# Patient Record
Sex: FEMALE | Race: BLACK OR AFRICAN AMERICAN | NOT HISPANIC OR LATINO | Employment: FULL TIME | URBAN - METROPOLITAN AREA
[De-identification: names, ages, dates, MRNs, and addresses within clinical notes are randomized per-mention and may not be internally consistent; named-entity substitution may affect disease eponyms.]

---

## 2018-07-06 ENCOUNTER — LAB REQUISITION (OUTPATIENT)
Dept: LAB | Facility: OTHER | Age: 23
End: 2018-07-06

## 2018-07-06 DIAGNOSIS — Z02.0 SCHOOL PHYSICAL EXAM: ICD-10-CM

## 2018-07-06 PROCEDURE — 86787 VARICELLA-ZOSTER ANTIBODY: CPT | Performed by: PREVENTIVE MEDICINE

## 2018-07-06 PROCEDURE — 86765 RUBEOLA ANTIBODY: CPT | Performed by: PREVENTIVE MEDICINE

## 2018-07-06 PROCEDURE — 86735 MUMPS ANTIBODY: CPT | Performed by: PREVENTIVE MEDICINE

## 2018-07-06 PROCEDURE — 86762 RUBELLA ANTIBODY: CPT | Performed by: PREVENTIVE MEDICINE

## 2018-07-10 LAB
MEV IGG SER IA-ACNC: POSITIVE
MUV IGG SER IA-ACNC: NEGATIVE
RUBV IGG SERPL IA-ACNC: POSITIVE
VZV IGG SER IA-ACNC: POSITIVE

## 2022-08-30 ENCOUNTER — OFFICE VISIT (OUTPATIENT)
Dept: INTERNAL MEDICINE | Facility: CLINIC | Age: 27
End: 2022-08-30

## 2022-08-30 VITALS
OXYGEN SATURATION: 98 % | DIASTOLIC BLOOD PRESSURE: 78 MMHG | TEMPERATURE: 96.9 F | BODY MASS INDEX: 39.27 KG/M2 | HEIGHT: 60 IN | HEART RATE: 69 BPM | WEIGHT: 200 LBS | SYSTOLIC BLOOD PRESSURE: 118 MMHG | RESPIRATION RATE: 16 BRPM

## 2022-08-30 DIAGNOSIS — D50.8 OTHER IRON DEFICIENCY ANEMIA: ICD-10-CM

## 2022-08-30 DIAGNOSIS — Z20.2 POSSIBLE EXPOSURE TO STD: ICD-10-CM

## 2022-08-30 DIAGNOSIS — Z00.00 ANNUAL PHYSICAL EXAM: Primary | ICD-10-CM

## 2022-08-30 DIAGNOSIS — E07.9 THYROID DISEASE: ICD-10-CM

## 2022-08-30 PROBLEM — D50.9 IDA (IRON DEFICIENCY ANEMIA): Status: ACTIVE | Noted: 2022-08-30

## 2022-08-30 PROCEDURE — 99395 PREV VISIT EST AGE 18-39: CPT | Performed by: NURSE PRACTITIONER

## 2022-08-30 PROCEDURE — 2014F MENTAL STATUS ASSESS: CPT | Performed by: NURSE PRACTITIONER

## 2022-08-30 PROCEDURE — 3008F BODY MASS INDEX DOCD: CPT | Performed by: NURSE PRACTITIONER

## 2022-08-30 NOTE — PROGRESS NOTES
"Chief Complaint  Establish Care    Subjective        Earl Maciel presents to [facility]  History of Present Illness  This is a 26 y/o female presenting to office for annual physical and establishment of care. Patient lives with partner and 2 children. Patient currently works as medical assistant.     Patient reports some exercise weekly. Patient reports that she is going to start following vegetarian diet.     Patient denies tobacco use. Patient reports occasional alcohol use.     Patient reports history of hypothyroidism before pregnancy however this self corrected post pregnancy.     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 Patient reports she never took the iron tablets due to constipation issues. Patient is concerned for possible anemia.     Patient reports needing gynecologist. Patient is due for pap smear. Patient is currently not on birth control.     Objective   Vital Signs:  /78 (BP Location: Right arm, Patient Position: Sitting, Cuff Size: Large Adult)   Pulse 69   Temp 96.9 °F (36.1 °C) (Temporal)   Resp 16   Ht 152.4 cm (60\")   Wt 90.7 kg (200 lb)   SpO2 98%   BMI 39.06 kg/m²   Estimated body mass index is 39.06 kg/m² as calculated from the following:    Height as of this encounter: 152.4 cm (60\").    Weight as of this encounter: 90.7 kg (200 lb).          Physical Exam  Constitutional:       General: She is awake.      Appearance: Normal appearance.   HENT:      Head: Normocephalic.      Right Ear: Hearing, tympanic membrane, ear canal and external ear normal.      Left Ear: Hearing, tympanic membrane, ear canal and external ear normal.      Nose: Nose normal.      Mouth/Throat:      Lips: Pink.      Mouth: Mucous membranes are moist.      Pharynx: Oropharynx is clear.   Eyes:      Extraocular Movements: Extraocular movements intact.      Conjunctiva/sclera: Conjunctivae normal.      Pupils: Pupils are equal, round, and reactive to light.   Cardiovascular:      Rate " and Rhythm: Normal rate and regular rhythm.      Pulses: Normal pulses.      Heart sounds: Normal heart sounds. No murmur heard.    No friction rub. No gallop.   Pulmonary:      Effort: Pulmonary effort is normal. No respiratory distress.      Breath sounds: Normal breath sounds. No wheezing or rales.   Abdominal:      General: Abdomen is flat. Bowel sounds are normal. There is no distension.      Palpations: Abdomen is soft.      Tenderness: There is no abdominal tenderness.   Musculoskeletal:         General: No swelling or deformity. Normal range of motion.      Cervical back: Normal range of motion and neck supple.   Skin:     General: Skin is warm and dry.      Capillary Refill: Capillary refill takes less than 2 seconds.      Coloration: Skin is not jaundiced.      Findings: No bruising.   Neurological:      General: No focal deficit present.      Mental Status: She is alert and oriented to person, place, and time. Mental status is at baseline.      Motor: Motor function is intact. No weakness.      Coordination: Coordination is intact.      Gait: Gait is intact.      Deep Tendon Reflexes: Reflexes are normal and symmetric.   Psychiatric:         Attention and Perception: Attention normal.         Mood and Affect: Mood normal.         Speech: Speech normal.         Behavior: Behavior normal. Behavior is cooperative.         Thought Content: Thought content normal.         Cognition and Memory: Cognition normal.         Judgment: Judgment normal.        Result Review :                Assessment and Plan   Diagnoses and all orders for this visit:    1. Annual physical exam (Primary)  Assessment & Plan:  Recommended 150-300 minutes weekly exercise.   Continue with healthy diet choices.   Labs ordered.   Pap smear due-- referral placed for gynecology.   Continue with monthly self breast examinations.   Anticipatory guidance given regarding health prevention/wellness, diet/exercise, tobacco/alcohol/drug education,  exercise and wellbeing, covid 19 guidance, and sexual health/STD education.       Orders:  -     CBC & Differential; Future  -     Comprehensive metabolic panel; Future  -     Lipid panel; Future  -     Hemoglobin A1c; Future  -     Ambulatory Referral to Gynecology    2. Thyroid disease  -     TSH Rfx On Abnormal To Free T4; Future    3. Other iron deficiency anemia  -     Comprehensive metabolic panel; Future  -     Iron; Future  -     Vitamin B12 and Folate; Future  -     Ferritin; Future    4. Possible exposure to STD  -     Chlamydia trachomatis, Neisseria gonorrhoeae, Trichomonas vaginalis, PCR - Urine, Urine, Clean Catch  -     HIV-1 / O / 2 Ag / Antibody 4th Generation; Future  -     RPR, Rfx Qn RPR / Confirm TP; Future           Follow Up   Return in about 1 year (around 8/30/2023) for Annual physical.  Patient was given instructions and counseling regarding her condition or for health maintenance advice. Please see specific information pulled into the AVS if appropriate.

## 2022-08-30 NOTE — ASSESSMENT & PLAN NOTE
Recommended 150-300 minutes weekly exercise.   Continue with healthy diet choices.   Labs ordered.   Pap smear due-- referral placed for gynecology.   Continue with monthly self breast examinations.   Anticipatory guidance given regarding health prevention/wellness, diet/exercise, tobacco/alcohol/drug education, exercise and wellbeing, covid 19 guidance, and sexual health/STD education.

## 2022-08-31 ENCOUNTER — LAB (OUTPATIENT)
Dept: INTERNAL MEDICINE | Facility: CLINIC | Age: 27
End: 2022-08-31

## 2022-08-31 DIAGNOSIS — D50.8 OTHER IRON DEFICIENCY ANEMIA: ICD-10-CM

## 2022-08-31 DIAGNOSIS — Z20.2 POSSIBLE EXPOSURE TO STD: ICD-10-CM

## 2022-08-31 DIAGNOSIS — E07.9 THYROID DISEASE: ICD-10-CM

## 2022-08-31 DIAGNOSIS — Z00.00 ANNUAL PHYSICAL EXAM: ICD-10-CM

## 2022-08-31 LAB
C TRACH RRNA SPEC QL NAA+PROBE: NEGATIVE
N GONORRHOEA RRNA SPEC QL NAA+PROBE: NEGATIVE
T VAGINALIS RRNA SPEC QL NAA+PROBE: NEGATIVE

## 2022-09-01 DIAGNOSIS — Z20.2 POSSIBLE EXPOSURE TO STD: Primary | ICD-10-CM

## 2022-09-01 DIAGNOSIS — D50.8 OTHER IRON DEFICIENCY ANEMIA: ICD-10-CM

## 2022-09-01 LAB
ALBUMIN SERPL-MCNC: 4 G/DL (ref 3.9–5)
ALBUMIN/GLOB SERPL: 1.5 {RATIO} (ref 1.2–2.2)
ALP SERPL-CCNC: 71 IU/L (ref 44–121)
ALT SERPL-CCNC: 12 IU/L (ref 0–32)
AST SERPL-CCNC: 14 IU/L (ref 0–40)
BASOPHILS # BLD AUTO: 0.1 X10E3/UL (ref 0–0.2)
BASOPHILS NFR BLD AUTO: 2 %
BILIRUB SERPL-MCNC: 0.2 MG/DL (ref 0–1.2)
BUN SERPL-MCNC: 8 MG/DL (ref 6–20)
BUN/CREAT SERPL: 9 (ref 9–23)
CALCIUM SERPL-MCNC: 8.9 MG/DL (ref 8.7–10.2)
CHLORIDE SERPL-SCNC: 103 MMOL/L (ref 96–106)
CHOLEST SERPL-MCNC: 163 MG/DL (ref 100–199)
CO2 SERPL-SCNC: 21 MMOL/L (ref 20–29)
CREAT SERPL-MCNC: 0.91 MG/DL (ref 0.57–1)
EGFRCR-CYS SERPLBLD CKD-EPI 2021: 90 ML/MIN/1.73
EOSINOPHIL # BLD AUTO: 0.1 X10E3/UL (ref 0–0.4)
EOSINOPHIL NFR BLD AUTO: 5 %
ERYTHROCYTE [DISTWIDTH] IN BLOOD BY AUTOMATED COUNT: 18.4 % (ref 11.7–15.4)
FERRITIN SERPL-MCNC: 7 NG/ML (ref 15–150)
FOLATE SERPL-MCNC: 6.4 NG/ML
GLOBULIN SER CALC-MCNC: 2.7 G/DL (ref 1.5–4.5)
GLUCOSE SERPL-MCNC: 95 MG/DL (ref 65–99)
HBA1C MFR BLD: 5.8 % (ref 4.8–5.6)
HCT VFR BLD AUTO: 27.3 % (ref 34–46.6)
HDLC SERPL-MCNC: 39 MG/DL
HGB BLD-MCNC: 7.9 G/DL (ref 11.1–15.9)
HIV 1+2 AB+HIV1 P24 AG SERPL QL IA: NON REACTIVE
IMM GRANULOCYTES # BLD AUTO: 0 X10E3/UL (ref 0–0.1)
IMM GRANULOCYTES NFR BLD AUTO: 0 %
IRON SERPL-MCNC: 17 UG/DL (ref 27–159)
LDLC SERPL CALC-MCNC: 110 MG/DL (ref 0–99)
LYMPHOCYTES # BLD AUTO: 1.3 X10E3/UL (ref 0.7–3.1)
LYMPHOCYTES NFR BLD AUTO: 41 %
MCH RBC QN AUTO: 19.8 PG (ref 26.6–33)
MCHC RBC AUTO-ENTMCNC: 28.9 G/DL (ref 31.5–35.7)
MCV RBC AUTO: 68 FL (ref 79–97)
MONOCYTES # BLD AUTO: 0.5 X10E3/UL (ref 0.1–0.9)
MONOCYTES NFR BLD AUTO: 15 %
NEUTROPHILS # BLD AUTO: 1.2 X10E3/UL (ref 1.4–7)
NEUTROPHILS NFR BLD AUTO: 37 %
PLATELET # BLD AUTO: 455 X10E3/UL (ref 150–450)
POTASSIUM SERPL-SCNC: 4.8 MMOL/L (ref 3.5–5.2)
PROT SERPL-MCNC: 6.7 G/DL (ref 6–8.5)
RBC # BLD AUTO: 3.99 X10E6/UL (ref 3.77–5.28)
RPR SER QL: REACTIVE
RPR SER-TITR: ABNORMAL {TITER}
SODIUM SERPL-SCNC: 138 MMOL/L (ref 134–144)
TREPONEMA PALLIDUM IGG+IGM AB [PRESENCE] IN SERUM OR PLASMA BY IMMUNOASSAY: REACTIVE
TRIGL SERPL-MCNC: 70 MG/DL (ref 0–149)
TSH SERPL DL<=0.005 MIU/L-ACNC: 2 UIU/ML (ref 0.45–4.5)
VIT B12 SERPL-MCNC: 779 PG/ML (ref 232–1245)
VLDLC SERPL CALC-MCNC: 14 MG/DL (ref 5–40)
WBC # BLD AUTO: 3.1 X10E3/UL (ref 3.4–10.8)

## 2022-09-01 NOTE — PROGRESS NOTES
Spoke with patient regarding results-- we will get RPR titer to confirm negativity; patient had prior syphilis infection in 2017 and had received PCN treatment regarding this.   Referral placed for hematology regarding ROSA-- patient to start taking iron daily.

## 2022-09-02 ENCOUNTER — LAB (OUTPATIENT)
Dept: INTERNAL MEDICINE | Facility: CLINIC | Age: 27
End: 2022-09-02

## 2022-09-02 DIAGNOSIS — D50.8 OTHER IRON DEFICIENCY ANEMIA: ICD-10-CM

## 2022-09-02 NOTE — PROGRESS NOTES
Spoke with patient again-- will get records from previous treatment center regarding previous dx of syphilis. Patient had received 3X doses of PCN G. However, patient has strong familiar history of autoimmune disorders-- maternal history of SLE along with paternal aunt with SLE. Agreeable to lab work up regarding this.

## 2022-09-07 LAB
ANA SER QL: NEGATIVE
C3 SERPL-MCNC: 146 MG/DL (ref 82–167)
C4 SERPL-MCNC: 36 MG/DL (ref 12–38)
DSDNA AB SER-ACNC: <1 IU/ML (ref 0–9)
RHEUMATOID FACT SERPL-ACNC: 12.2 IU/ML

## 2022-09-07 NOTE — PROGRESS NOTES
Discussed results with patient-- lab work overall unremarkable. Patient is working on getting former STI treatment results from previous facility. Will review and then decide if patient needs repeat treatment for syphilis.

## 2022-09-08 ENCOUNTER — LAB (OUTPATIENT)
Dept: LAB | Facility: HOSPITAL | Age: 27
End: 2022-09-08

## 2022-09-08 ENCOUNTER — CONSULT (OUTPATIENT)
Dept: ONCOLOGY | Facility: CLINIC | Age: 27
End: 2022-09-08

## 2022-09-08 VITALS
TEMPERATURE: 97.1 F | HEART RATE: 56 BPM | BODY MASS INDEX: 39.34 KG/M2 | WEIGHT: 200.4 LBS | RESPIRATION RATE: 16 BRPM | HEIGHT: 60 IN | SYSTOLIC BLOOD PRESSURE: 122 MMHG | DIASTOLIC BLOOD PRESSURE: 84 MMHG | OXYGEN SATURATION: 100 %

## 2022-09-08 DIAGNOSIS — D50.0 IRON DEFICIENCY ANEMIA DUE TO CHRONIC BLOOD LOSS: Primary | ICD-10-CM

## 2022-09-08 DIAGNOSIS — D50.8 OTHER IRON DEFICIENCY ANEMIA: Primary | ICD-10-CM

## 2022-09-08 PROBLEM — T45.4X5A ADVERSE EFFECT OF IRON: Status: ACTIVE | Noted: 2022-09-08

## 2022-09-08 LAB
BASOPHILS # BLD AUTO: 0.05 10*3/MM3 (ref 0–0.2)
BASOPHILS NFR BLD AUTO: 1 % (ref 0–1.5)
DEPRECATED RDW RBC AUTO: 42.5 FL (ref 37–54)
EOSINOPHIL # BLD AUTO: 0.16 10*3/MM3 (ref 0–0.4)
EOSINOPHIL NFR BLD AUTO: 3.2 % (ref 0.3–6.2)
ERYTHROCYTE [DISTWIDTH] IN BLOOD BY AUTOMATED COUNT: 18.1 % (ref 12.3–15.4)
HCT VFR BLD AUTO: 28.4 % (ref 34–46.6)
HGB BLD-MCNC: 8.8 G/DL (ref 12–15.9)
IMM GRANULOCYTES # BLD AUTO: 0.04 10*3/MM3 (ref 0–0.05)
IMM GRANULOCYTES NFR BLD AUTO: 0.8 % (ref 0–0.5)
LYMPHOCYTES # BLD AUTO: 1.93 10*3/MM3 (ref 0.7–3.1)
LYMPHOCYTES NFR BLD AUTO: 39.1 % (ref 19.6–45.3)
MCH RBC QN AUTO: 20.5 PG (ref 26.6–33)
MCHC RBC AUTO-ENTMCNC: 31 G/DL (ref 31.5–35.7)
MCV RBC AUTO: 66.2 FL (ref 79–97)
MONOCYTES # BLD AUTO: 0.64 10*3/MM3 (ref 0.1–0.9)
MONOCYTES NFR BLD AUTO: 13 % (ref 5–12)
NEUTROPHILS NFR BLD AUTO: 2.11 10*3/MM3 (ref 1.7–7)
NEUTROPHILS NFR BLD AUTO: 42.9 % (ref 42.7–76)
NRBC BLD AUTO-RTO: 0 /100 WBC (ref 0–0.2)
PLATELET # BLD AUTO: 277 10*3/MM3 (ref 140–450)
PMV BLD AUTO: 10.6 FL (ref 6–12)
RBC # BLD AUTO: 4.29 10*6/MM3 (ref 3.77–5.28)
WBC NRBC COR # BLD: 4.93 10*3/MM3 (ref 3.4–10.8)

## 2022-09-08 PROCEDURE — 85025 COMPLETE CBC W/AUTO DIFF WBC: CPT

## 2022-09-08 PROCEDURE — 99205 OFFICE O/P NEW HI 60 MIN: CPT | Performed by: INTERNAL MEDICINE

## 2022-09-08 PROCEDURE — 36415 COLL VENOUS BLD VENIPUNCTURE: CPT

## 2022-09-08 RX ORDER — SODIUM CHLORIDE 9 MG/ML
250 INJECTION, SOLUTION INTRAVENOUS ONCE
Status: CANCELLED | OUTPATIENT
Start: 2022-10-11

## 2022-09-08 RX ORDER — DIPHENHYDRAMINE HCL 25 MG
25 CAPSULE ORAL ONCE
Status: CANCELLED | OUTPATIENT
Start: 2022-10-11

## 2022-09-08 RX ORDER — SODIUM CHLORIDE 9 MG/ML
250 INJECTION, SOLUTION INTRAVENOUS ONCE
Status: CANCELLED | OUTPATIENT
Start: 2022-09-15

## 2022-09-08 RX ORDER — DIPHENHYDRAMINE HCL 25 MG
25 CAPSULE ORAL ONCE
Status: CANCELLED | OUTPATIENT
Start: 2022-09-15

## 2022-09-08 RX ORDER — ACETAMINOPHEN 325 MG/1
650 TABLET ORAL ONCE
Status: CANCELLED | OUTPATIENT
Start: 2022-09-22

## 2022-09-08 RX ORDER — DIPHENHYDRAMINE HCL 25 MG
25 CAPSULE ORAL ONCE
Status: CANCELLED | OUTPATIENT
Start: 2022-10-04

## 2022-09-08 RX ORDER — ACETAMINOPHEN 325 MG/1
650 TABLET ORAL ONCE
Status: CANCELLED | OUTPATIENT
Start: 2022-10-11

## 2022-09-08 RX ORDER — SODIUM CHLORIDE 9 MG/ML
250 INJECTION, SOLUTION INTRAVENOUS ONCE
Status: CANCELLED | OUTPATIENT
Start: 2022-09-22

## 2022-09-08 RX ORDER — ACETAMINOPHEN 325 MG/1
650 TABLET ORAL ONCE
Status: CANCELLED | OUTPATIENT
Start: 2022-10-04

## 2022-09-08 RX ORDER — DIPHENHYDRAMINE HCL 25 MG
25 CAPSULE ORAL ONCE
Status: CANCELLED | OUTPATIENT
Start: 2022-09-22

## 2022-09-08 RX ORDER — SODIUM CHLORIDE 9 MG/ML
250 INJECTION, SOLUTION INTRAVENOUS ONCE
Status: CANCELLED | OUTPATIENT
Start: 2022-10-04

## 2022-09-08 RX ORDER — ACETAMINOPHEN 325 MG/1
650 TABLET ORAL ONCE
Status: CANCELLED | OUTPATIENT
Start: 2022-09-15

## 2022-09-08 NOTE — PROGRESS NOTES
[facility]    CONSULTING IN BLOOD DISORDERS & CANCER      REASON FOR CONSULTATION/CHIEF COMPLAINT:     Evaluation and management for iron deficiency anemia                             REQUESTING PHYSICIAN: SIA Mendenhall  RECORDS OBTAINED:  Records of the patients history including those from the electronic medical record were reviewed and summarized in detail.    HISTORY OF PRESENT ILLNESS:    The patient is a 27 y.o. year old female is a pleasant 27-year-old -American female with medical history significant for iron deficiency anemia and postpartum hypothyroidism who had presented to her PCP with fatigue and mental fogginess and August 2022.  She was noted to have severe anemia with hemoglobin of 7.9 on the CBC from 8/31/2022.  WBC count of 3.1, hematocrit of 27.3, MCV 68 and platelet count of 455,000.  Additional work-up showed severe iron deficiency with iron level of 17 mcg/dL and ferritin 7 ng/ml.  Vitamin B12 and folate levels were within normal range.   Patient was referred to hematology for further evaluation and management.    Patient states she was first noted to have iron deficiency anemia during her pregnancy in 2015.  She had received iron infusions during that time.  Patient states she has always had heavy menstrual bleed.  Denies taking any oral contraceptive pills.  Since she does not need any gynecology at this time.    Denies any blood in stool or urine.  She does report of eating regular diet, however she is trying to not eat any meat products.  Denies any family history of sickle cell disease or any other hemoglobinopathies.  Denies alcohol, tobacco or drug abuse.  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    PMH/PSH:  -History of iron deficiency  - Postpartum hypothyroidism  -History of syphilis    No past medical history on file.  No past surgical history on file.    MEDICATIONS  No current outpatient medications on file.    ALLERGIES:   No Known  "Allergies    SOCIAL HISTORY:       Social History     Socioeconomic History   • Marital status: Single   Tobacco Use   • Smoking status: Never Smoker   Substance and Sexual Activity   • Alcohol use: Not Currently         FAMILY HISTORY:  Family History   Problem Relation Age of Onset   • No Known Problems Mother    • No Known Problems Father        REVIEW OF SYSTEMS:  Constitutional: [No fevers, chills, sweats]  Eye: [No recent visual problems]  ENMT: [No ear pain, nasal congestion, sore throat]  Respiratory: [No shortness of breath, cough]  Cardiovascular: [No Chest pain, palpitations, syncope]  Gastrointestinal: [No nausea, vomiting, diarrhea]  Genitourinary: [No hematuria]  Hema/Lymph: [Negative for bruising tendency, swollen lymph glands]  Endocrine: [Negative for excessive thirst, excessive hunger]  Musculoskeletal: [Denies any musculoskeletal pain or swelling]  Integumentary: [No rash, pruritus, abrasions]  Neurologic: [ No weakness or numbness, Alert & oriented X 4]         Vitals:    09/08/22 0925   BP: 122/84   Pulse: 56   Resp: 16   Temp: 97.1 °F (36.2 °C)   TempSrc: Temporal   SpO2: 100%   Weight: 90.9 kg (200 lb 6.4 oz)   Height: 152 cm (59.84\")   PainSc: 0-No pain     Current Status 9/8/2022   ECOG score 0      PHYSICAL EXAM:    CONSTITUTIONAL:  Vital signs reviewed.  No distress, looks comfortable.  EYES:  Conjunctiva and lids unremarkable.   EARS,NOSE,MOUTH,THROAT:  Ears and nose appear unremarkable.  Lips, teeth, gums appear unremarkable.  RESPIRATORY:  Normal respiratory effort.  Lungs clear to auscultation bilaterally.  CARDIOVASCULAR:  Normal S1, S2.  No murmurs rubs or gallops.  No significant lower extremity edema.  GASTROINTESTINAL: Abdomen appears unremarkable.  Nondistended LYMPHATIC:  No cervical, supraclavicular lymphadenopathy.  NEURO: cranial nerves 2-12 grossly intact.  No focal deficits.  Appears to have equal strength all 4 extremities.  MUSCULOSKELETAL:  Unremarkable digits/nails.  No " cyanosis or clubbing.  No apparent joint deformities.  SKIN:  Warm.  No rashes.  PSYCHIATRIC:  Normal judgment and insight.  Normal mood and affect.     RECENT LABS:        Lab on 09/08/2022   Component Date Value Ref Range Status   • WBC 09/08/2022 4.93  3.40 - 10.80 10*3/mm3 Final   • RBC 09/08/2022 4.29  3.77 - 5.28 10*6/mm3 Final   • Hemoglobin 09/08/2022 8.8 (A) 12.0 - 15.9 g/dL Final   • Hematocrit 09/08/2022 28.4 (A) 34.0 - 46.6 % Final   • MCV 09/08/2022 66.2 (A) 79.0 - 97.0 fL Final   • MCH 09/08/2022 20.5 (A) 26.6 - 33.0 pg Final   • MCHC 09/08/2022 31.0 (A) 31.5 - 35.7 g/dL Final   • RDW 09/08/2022 18.1 (A) 12.3 - 15.4 % Final   • RDW-SD 09/08/2022 42.5  37.0 - 54.0 fl Final   • MPV 09/08/2022 10.6  6.0 - 12.0 fL Final   • Platelets 09/08/2022 277  140 - 450 10*3/mm3 Final   • Neutrophil % 09/08/2022 42.9  42.7 - 76.0 % Final   • Lymphocyte % 09/08/2022 39.1  19.6 - 45.3 % Final   • Monocyte % 09/08/2022 13.0 (A) 5.0 - 12.0 % Final   • Eosinophil % 09/08/2022 3.2  0.3 - 6.2 % Final   • Basophil % 09/08/2022 1.0  0.0 - 1.5 % Final   • Immature Grans % 09/08/2022 0.8 (A) 0.0 - 0.5 % Final   • Neutrophils, Absolute 09/08/2022 2.11  1.70 - 7.00 10*3/mm3 Final   • Lymphocytes, Absolute 09/08/2022 1.93  0.70 - 3.10 10*3/mm3 Final   • Monocytes, Absolute 09/08/2022 0.64  0.10 - 0.90 10*3/mm3 Final   • Eosinophils, Absolute 09/08/2022 0.16  0.00 - 0.40 10*3/mm3 Final   • Basophils, Absolute 09/08/2022 0.05  0.00 - 0.20 10*3/mm3 Final   • Immature Grans, Absolute 09/08/2022 0.04  0.00 - 0.05 10*3/mm3 Final   • nRBC 09/08/2022 0.0  0.0 - 0.2 /100 WBC Final   Lab on 09/02/2022   Component Date Value Ref Range Status   • C3 Complement 09/02/2022 146  82 - 167 mg/dL Final   • C4 Complement 09/02/2022 36  12 - 38 mg/dL Final   • Anti-DNA (DS) Ab Qn 09/02/2022 <1  0 - 9 IU/mL Final    Comment:                                    Negative      <5                                     Equivocal  5 - 9                                      Positive      >9     • MILLICENT Direct 09/02/2022 Negative  Negative Final   • RA Latex Turbid 09/02/2022 12.2  <14.0 IU/mL Final         ASSESSMENT:   is a pleasant 27-year-old -American female with medical history significant for iron deficiency anemia and postpartum hypothyroidism who comes for iron deficiency anemia evaluation and management.    #Microcytic anemia:   · Hemoglobin 7.9, hematocrit 27 & MCV 68.  Additional work-up showed severe deficiency with iron level of 17 & ferritin 7 ng/ml.  Normal vitamin B12 and folate.  · Peripheral smear review performed by me in clinic shows microcytic RBCs, no increased schistocytes, no blasts or increased immature cells, adequate platelets.     · Likely related to heavy menstrual bleed and subsequent deficiency.  Patient denies any blood loss in urine or stool.  Reports of eating adequate diet.  No family history of sickle cell disease or any other hemoglobinopathies.  · Discussed plan to repeat iron profile and hemoglobin electrophoresis.  Will plan to replace iron parenterally.     # Iron deficiency anemia: Has poor tolerance to PO iron. Plan to administer IV venofer 300 mg x 4 doses.     #Thrombocytosis: Likely ROSA related.     PLAN:   - Repeat iron profile and Hb electrophoresis  - Plan to administer IV venofer 300 mg x 4 doses  - F/u in 2 months with repeat labs    Orders Placed This Encounter   Procedures   • Lactate Dehydrogenase     Order Specific Question:   Release to patient     Answer:   Routine Release   • Haptoglobin     Order Specific Question:   Release to patient     Answer:   Routine Release   • Hemoglobinopathy Fractionation Cascade     Order Specific Question:   Release to patient     Answer:   Routine Release   • Ferritin   • Iron Profile   • Ambulatory Referral to Gynecology     Referral Priority:   Routine     Referral Type:   Consultation     Referral Reason:   Specialty Services Required     Requested Specialty:    Gynecology     Number of Visits Requested:   1   Total time spent during this patient encounter is 65 minutes. The total time spent with the patient includes at least one or more of the following: preparing to see the patient by reviewing of tests, prior notes or other relevant information, performing appropriate independent examination & evaluation, counseling, ordering of medications, tests or procedures, communicating with other healthcare professionals, when appropriate to coordinate care, documenting clinic information in the electronic medical records or other health records, independently interpreting results of tests and communicating the results to the patient/family or caregiver.

## 2022-09-09 ENCOUNTER — PATIENT ROUNDING (BHMG ONLY) (OUTPATIENT)
Dept: ONCOLOGY | Facility: CLINIC | Age: 27
End: 2022-09-09

## 2022-09-09 NOTE — PROGRESS NOTES
A My-Chart message has been sent to the patient for PATIENT ROUNDING with [facility].

## 2022-09-13 ENCOUNTER — TELEPHONE (OUTPATIENT)
Dept: ONCOLOGY | Facility: CLINIC | Age: 27
End: 2022-09-13

## 2022-09-13 NOTE — TELEPHONE ENCOUNTER
----- Message from Brandi Prescott RN sent at 9/9/2022  4:12 PM EDT -----  Please schedule venofer x4    Thank you!    ----- Message -----  From: Florencio Catalan MD  Sent: 9/8/2022   9:44 AM EDT  To: Brandi Prescott RN    Can we schedule this patient for IV venofer 300 mg x 4 weekly doses? Iron def. Can not tolerate po iron.     Thanks,

## 2022-09-20 ENCOUNTER — INFUSION (OUTPATIENT)
Dept: ONCOLOGY | Facility: HOSPITAL | Age: 27
End: 2022-09-20

## 2022-09-20 VITALS
RESPIRATION RATE: 18 BRPM | HEART RATE: 79 BPM | BODY MASS INDEX: 39.15 KG/M2 | TEMPERATURE: 98 F | DIASTOLIC BLOOD PRESSURE: 76 MMHG | OXYGEN SATURATION: 100 % | SYSTOLIC BLOOD PRESSURE: 109 MMHG | WEIGHT: 199.4 LBS

## 2022-09-20 DIAGNOSIS — T45.4X5A ADVERSE EFFECT OF IRON, INITIAL ENCOUNTER: ICD-10-CM

## 2022-09-20 DIAGNOSIS — D50.0 IRON DEFICIENCY ANEMIA DUE TO CHRONIC BLOOD LOSS: Primary | ICD-10-CM

## 2022-09-20 PROCEDURE — 96365 THER/PROPH/DIAG IV INF INIT: CPT

## 2022-09-20 PROCEDURE — 25010000002 IRON SUCROSE PER 1 MG: Performed by: INTERNAL MEDICINE

## 2022-09-20 PROCEDURE — 63710000001 DIPHENHYDRAMINE PER 50 MG: Performed by: INTERNAL MEDICINE

## 2022-09-20 PROCEDURE — 96375 TX/PRO/DX INJ NEW DRUG ADDON: CPT

## 2022-09-20 PROCEDURE — 96366 THER/PROPH/DIAG IV INF ADDON: CPT

## 2022-09-20 RX ORDER — FAMOTIDINE 10 MG/ML
20 INJECTION, SOLUTION INTRAVENOUS ONCE
Status: COMPLETED | OUTPATIENT
Start: 2022-09-20 | End: 2022-09-20

## 2022-09-20 RX ORDER — DIPHENHYDRAMINE HCL 25 MG
25 CAPSULE ORAL ONCE
Status: COMPLETED | OUTPATIENT
Start: 2022-09-20 | End: 2022-09-20

## 2022-09-20 RX ORDER — SODIUM CHLORIDE 9 MG/ML
250 INJECTION, SOLUTION INTRAVENOUS ONCE
Status: COMPLETED | OUTPATIENT
Start: 2022-09-20 | End: 2022-09-20

## 2022-09-20 RX ORDER — ACETAMINOPHEN 325 MG/1
650 TABLET ORAL ONCE
Status: COMPLETED | OUTPATIENT
Start: 2022-09-20 | End: 2022-09-20

## 2022-09-20 RX ADMIN — DIPHENHYDRAMINE HYDROCHLORIDE 25 MG: 25 CAPSULE ORAL at 13:24

## 2022-09-20 RX ADMIN — ACETAMINOPHEN 650 MG: 325 TABLET, FILM COATED ORAL at 13:23

## 2022-09-20 RX ADMIN — FAMOTIDINE 20 MG: 10 INJECTION, SOLUTION INTRAVENOUS at 16:12

## 2022-09-20 RX ADMIN — SODIUM CHLORIDE 250 ML: 9 INJECTION, SOLUTION INTRAVENOUS at 13:55

## 2022-09-20 RX ADMIN — IRON SUCROSE 300 MG: 20 INJECTION, SOLUTION INTRAVENOUS at 13:55

## 2022-09-20 NOTE — NURSING NOTE
Pt reports resolution of feet tingling.  Pt given phone number and asked to call if needed for on call MD.  Pt v/u.

## 2022-09-20 NOTE — NURSING NOTE
Upon completion of post observation for Venofer, pt noted tingling in the top of her feet. No itching, SOA, or other complaints. R/w Carmelina NP, order for 20 mg IV pepcid and 10 minutes post administration pt okay for D/C

## 2022-09-27 ENCOUNTER — INFUSION (OUTPATIENT)
Dept: ONCOLOGY | Facility: HOSPITAL | Age: 27
End: 2022-09-27

## 2022-09-27 VITALS
BODY MASS INDEX: 38.91 KG/M2 | RESPIRATION RATE: 18 BRPM | SYSTOLIC BLOOD PRESSURE: 106 MMHG | DIASTOLIC BLOOD PRESSURE: 69 MMHG | OXYGEN SATURATION: 100 % | WEIGHT: 198.2 LBS | HEART RATE: 89 BPM | TEMPERATURE: 97.5 F

## 2022-09-27 DIAGNOSIS — D50.0 IRON DEFICIENCY ANEMIA DUE TO CHRONIC BLOOD LOSS: Primary | ICD-10-CM

## 2022-09-27 DIAGNOSIS — T45.4X5A ADVERSE EFFECT OF IRON, INITIAL ENCOUNTER: ICD-10-CM

## 2022-09-27 PROCEDURE — 63710000001 DIPHENHYDRAMINE PER 50 MG: Performed by: INTERNAL MEDICINE

## 2022-09-27 PROCEDURE — 96365 THER/PROPH/DIAG IV INF INIT: CPT

## 2022-09-27 PROCEDURE — 96375 TX/PRO/DX INJ NEW DRUG ADDON: CPT

## 2022-09-27 PROCEDURE — 25010000002 IRON SUCROSE PER 1 MG: Performed by: INTERNAL MEDICINE

## 2022-09-27 PROCEDURE — 96367 TX/PROPH/DG ADDL SEQ IV INF: CPT

## 2022-09-27 RX ORDER — ACETAMINOPHEN 325 MG/1
650 TABLET ORAL ONCE
Status: COMPLETED | OUTPATIENT
Start: 2022-09-27 | End: 2022-09-27

## 2022-09-27 RX ORDER — DIPHENHYDRAMINE HCL 25 MG
25 CAPSULE ORAL ONCE
Status: COMPLETED | OUTPATIENT
Start: 2022-09-27 | End: 2022-09-27

## 2022-09-27 RX ORDER — SODIUM CHLORIDE 9 MG/ML
250 INJECTION, SOLUTION INTRAVENOUS ONCE
Status: COMPLETED | OUTPATIENT
Start: 2022-09-27 | End: 2022-09-27

## 2022-09-27 RX ORDER — FAMOTIDINE 10 MG/ML
20 INJECTION, SOLUTION INTRAVENOUS ONCE
Status: COMPLETED | OUTPATIENT
Start: 2022-09-27 | End: 2022-09-27

## 2022-09-27 RX ADMIN — FAMOTIDINE 20 MG: 10 INJECTION, SOLUTION INTRAVENOUS at 13:11

## 2022-09-27 RX ADMIN — IRON SUCROSE 300 MG: 20 INJECTION, SOLUTION INTRAVENOUS at 13:39

## 2022-09-27 RX ADMIN — ACETAMINOPHEN 325MG 650 MG: 325 TABLET ORAL at 13:11

## 2022-09-27 RX ADMIN — DIPHENHYDRAMINE HYDROCHLORIDE 25 MG: 25 CAPSULE ORAL at 13:11

## 2022-09-27 RX ADMIN — SODIUM CHLORIDE 250 ML: 9 INJECTION, SOLUTION INTRAVENOUS at 13:11

## 2022-10-04 ENCOUNTER — INFUSION (OUTPATIENT)
Dept: ONCOLOGY | Facility: HOSPITAL | Age: 27
End: 2022-10-04

## 2022-10-04 VITALS
SYSTOLIC BLOOD PRESSURE: 126 MMHG | TEMPERATURE: 98.7 F | WEIGHT: 202 LBS | HEART RATE: 68 BPM | OXYGEN SATURATION: 100 % | BODY MASS INDEX: 39.66 KG/M2 | RESPIRATION RATE: 16 BRPM | DIASTOLIC BLOOD PRESSURE: 74 MMHG

## 2022-10-04 DIAGNOSIS — D50.0 IRON DEFICIENCY ANEMIA DUE TO CHRONIC BLOOD LOSS: Primary | ICD-10-CM

## 2022-10-04 DIAGNOSIS — T45.4X5A ADVERSE EFFECT OF IRON, INITIAL ENCOUNTER: ICD-10-CM

## 2022-10-04 PROCEDURE — 96365 THER/PROPH/DIAG IV INF INIT: CPT

## 2022-10-04 PROCEDURE — 96366 THER/PROPH/DIAG IV INF ADDON: CPT

## 2022-10-04 PROCEDURE — 25010000002 IRON SUCROSE PER 1 MG: Performed by: INTERNAL MEDICINE

## 2022-10-04 PROCEDURE — 96375 TX/PRO/DX INJ NEW DRUG ADDON: CPT

## 2022-10-04 PROCEDURE — 63710000001 DIPHENHYDRAMINE PER 50 MG: Performed by: INTERNAL MEDICINE

## 2022-10-04 RX ORDER — SODIUM CHLORIDE 9 MG/ML
250 INJECTION, SOLUTION INTRAVENOUS ONCE
Status: COMPLETED | OUTPATIENT
Start: 2022-10-04 | End: 2022-10-04

## 2022-10-04 RX ORDER — FAMOTIDINE 10 MG/ML
20 INJECTION, SOLUTION INTRAVENOUS ONCE
Status: COMPLETED | OUTPATIENT
Start: 2022-10-04 | End: 2022-10-04

## 2022-10-04 RX ORDER — ACETAMINOPHEN 325 MG/1
650 TABLET ORAL ONCE
Status: COMPLETED | OUTPATIENT
Start: 2022-10-04 | End: 2022-10-04

## 2022-10-04 RX ORDER — DIPHENHYDRAMINE HCL 25 MG
25 CAPSULE ORAL ONCE
Status: COMPLETED | OUTPATIENT
Start: 2022-10-04 | End: 2022-10-04

## 2022-10-04 RX ADMIN — IRON SUCROSE 300 MG: 20 INJECTION, SOLUTION INTRAVENOUS at 13:44

## 2022-10-04 RX ADMIN — DIPHENHYDRAMINE HYDROCHLORIDE 25 MG: 25 CAPSULE ORAL at 13:20

## 2022-10-04 RX ADMIN — SODIUM CHLORIDE 250 ML: 9 INJECTION, SOLUTION INTRAVENOUS at 13:20

## 2022-10-04 RX ADMIN — FAMOTIDINE 20 MG: 10 INJECTION, SOLUTION INTRAVENOUS at 13:20

## 2022-10-04 RX ADMIN — ACETAMINOPHEN 650 MG: 325 TABLET, FILM COATED ORAL at 13:20

## 2022-10-11 ENCOUNTER — INFUSION (OUTPATIENT)
Dept: ONCOLOGY | Facility: HOSPITAL | Age: 27
End: 2022-10-11

## 2022-10-11 VITALS
SYSTOLIC BLOOD PRESSURE: 120 MMHG | WEIGHT: 203.8 LBS | OXYGEN SATURATION: 100 % | TEMPERATURE: 98 F | RESPIRATION RATE: 16 BRPM | HEART RATE: 60 BPM | BODY MASS INDEX: 40.01 KG/M2 | DIASTOLIC BLOOD PRESSURE: 82 MMHG

## 2022-10-11 DIAGNOSIS — D50.0 IRON DEFICIENCY ANEMIA DUE TO CHRONIC BLOOD LOSS: Primary | ICD-10-CM

## 2022-10-11 DIAGNOSIS — T45.4X5A ADVERSE EFFECT OF IRON, INITIAL ENCOUNTER: ICD-10-CM

## 2022-10-11 PROCEDURE — 96365 THER/PROPH/DIAG IV INF INIT: CPT

## 2022-10-11 PROCEDURE — 63710000001 DIPHENHYDRAMINE PER 50 MG: Performed by: INTERNAL MEDICINE

## 2022-10-11 PROCEDURE — 25010000002 IRON SUCROSE PER 1 MG: Performed by: INTERNAL MEDICINE

## 2022-10-11 PROCEDURE — 96375 TX/PRO/DX INJ NEW DRUG ADDON: CPT

## 2022-10-11 RX ORDER — SODIUM CHLORIDE 9 MG/ML
250 INJECTION, SOLUTION INTRAVENOUS ONCE
Status: COMPLETED | OUTPATIENT
Start: 2022-10-11 | End: 2022-10-11

## 2022-10-11 RX ORDER — DIPHENHYDRAMINE HCL 25 MG
25 CAPSULE ORAL ONCE
Status: COMPLETED | OUTPATIENT
Start: 2022-10-11 | End: 2022-10-11

## 2022-10-11 RX ORDER — ACETAMINOPHEN 325 MG/1
650 TABLET ORAL ONCE
Status: COMPLETED | OUTPATIENT
Start: 2022-10-11 | End: 2022-10-11

## 2022-10-11 RX ORDER — FAMOTIDINE 10 MG/ML
20 INJECTION, SOLUTION INTRAVENOUS ONCE
Status: COMPLETED | OUTPATIENT
Start: 2022-10-11 | End: 2022-10-11

## 2022-10-11 RX ADMIN — FAMOTIDINE 20 MG: 10 INJECTION, SOLUTION INTRAVENOUS at 13:17

## 2022-10-11 RX ADMIN — DIPHENHYDRAMINE HYDROCHLORIDE 25 MG: 25 CAPSULE ORAL at 13:17

## 2022-10-11 RX ADMIN — IRON SUCROSE 300 MG: 20 INJECTION, SOLUTION INTRAVENOUS at 13:43

## 2022-10-11 RX ADMIN — SODIUM CHLORIDE 250 ML: 9 INJECTION, SOLUTION INTRAVENOUS at 13:16

## 2022-10-11 RX ADMIN — ACETAMINOPHEN 650 MG: 325 TABLET, FILM COATED ORAL at 13:17

## 2022-10-18 ENCOUNTER — OFFICE VISIT (OUTPATIENT)
Dept: INTERNAL MEDICINE | Facility: CLINIC | Age: 27
End: 2022-10-18

## 2022-10-18 VITALS
OXYGEN SATURATION: 99 % | BODY MASS INDEX: 39.66 KG/M2 | HEIGHT: 60 IN | WEIGHT: 202 LBS | SYSTOLIC BLOOD PRESSURE: 123 MMHG | HEART RATE: 76 BPM | DIASTOLIC BLOOD PRESSURE: 86 MMHG | TEMPERATURE: 98 F

## 2022-10-18 DIAGNOSIS — J02.9 SORE THROAT: Primary | ICD-10-CM

## 2022-10-18 DIAGNOSIS — H65.191 ACUTE EFFUSION OF RIGHT EAR: ICD-10-CM

## 2022-10-18 LAB
EXPIRATION DATE: NORMAL
EXPIRATION DATE: NORMAL
FLUAV AG UPPER RESP QL IA.RAPID: NOT DETECTED
FLUBV AG UPPER RESP QL IA.RAPID: NOT DETECTED
INTERNAL CONTROL: NORMAL
INTERNAL CONTROL: NORMAL
Lab: NORMAL
Lab: NORMAL
S PYO AG THROAT QL: NEGATIVE
SARS-COV-2 RNA RESP QL NAA+PROBE: NOT DETECTED

## 2022-10-18 PROCEDURE — 87880 STREP A ASSAY W/OPTIC: CPT | Performed by: NURSE PRACTITIONER

## 2022-10-18 PROCEDURE — 99213 OFFICE O/P EST LOW 20 MIN: CPT | Performed by: NURSE PRACTITIONER

## 2022-10-18 PROCEDURE — 87636 SARSCOV2 & INF A&B AMP PRB: CPT | Performed by: NURSE PRACTITIONER

## 2022-10-18 NOTE — PROGRESS NOTES
"Chief Complaint  Annual Exam    Subjective        Earl Maciel presents to [facility]  History of Present Illness  This is a 26 y/o female presenting to office for complaints of sore throat-- reports left side of throat is hurting and burning. Patient reports nasal congestion and rhinitis. Patient reports some tenderness to left side. Patient reports not taking any medication. Denies any fever. Denies n/v/d.   Objective   Vital Signs:  /86 (BP Location: Right arm, Patient Position: Sitting, Cuff Size: Adult)   Pulse 76   Temp 98 °F (36.7 °C) (Infrared)   Ht 152.4 cm (60\")   Wt 91.6 kg (202 lb)   SpO2 99%   BMI 39.45 kg/m²   Estimated body mass index is 39.45 kg/m² as calculated from the following:    Height as of this encounter: 152.4 cm (60\").    Weight as of this encounter: 91.6 kg (202 lb).          Physical Exam  Constitutional:       Appearance: Normal appearance.   HENT:      Head: Normocephalic and atraumatic.      Right Ear: Ear canal and external ear normal. A middle ear effusion is present.      Left Ear: Ear canal and external ear normal. A middle ear effusion is present.      Nose: Rhinorrhea present.      Mouth/Throat:      Mouth: Mucous membranes are moist.      Pharynx: Oropharynx is clear.   Eyes:      Conjunctiva/sclera: Conjunctivae normal.      Pupils: Pupils are equal, round, and reactive to light.   Cardiovascular:      Rate and Rhythm: Normal rate and regular rhythm.      Pulses: Normal pulses.      Heart sounds: Normal heart sounds. No murmur heard.    No friction rub. No gallop.   Pulmonary:      Effort: Pulmonary effort is normal. No respiratory distress.      Breath sounds: Normal breath sounds. No stridor. No wheezing, rhonchi or rales.   Musculoskeletal:      Cervical back: Normal range of motion and neck supple.   Skin:     General: Skin is warm and dry.   Neurological:      General: No focal deficit present.      Mental Status: She is alert " and oriented to person, place, and time. Mental status is at baseline.   Psychiatric:         Mood and Affect: Mood normal.         Thought Content: Thought content normal.         Judgment: Judgment normal.        Result Review :  The following data was reviewed by: SIA Mendenhall on 10/18/2022:  Common labs    Common Labs 8/31/22 8/31/22 8/31/22 8/31/22 9/8/22    0840 0840 0840 0840    Glucose   95     BUN   8     Creatinine   0.91     Sodium   138     Potassium   4.8     Chloride   103     Calcium   8.9     Total Protein   6.7     Albumin   4.0     Total Bilirubin   0.2     Alkaline Phosphatase   71     AST (SGOT)   14     ALT (SGPT)   12     WBC    3.1 (A) 4.93   Hemoglobin    7.9 (A) 8.8 (A)   Hematocrit    27.3 (A) 28.4 (A)   Platelets    455 (A) 277   Total Cholesterol  163      Triglycerides  70      HDL Cholesterol  39 (A)      LDL Cholesterol   110 (A)      Hemoglobin A1C 5.8 (A)       (A) Abnormal value       Comments are available for some flowsheets but are not being displayed.                     Assessment and Plan   Diagnoses and all orders for this visit:    1. Sore throat (Primary)  Assessment & Plan:  Covid/flu/strep neg.   Okay to gargle with warm salt water.   Tylenol 650mg PRN for pain.     Orders:  -     POCT rapid strep A  -     Covid-19 + Flu A&B AG, Veritor    2. Acute effusion of right ear  Assessment & Plan:  May benefit from antihistamine PRN.   Flonase PRN.              Follow Up   Return if symptoms worsen or fail to improve.  Patient was given instructions and counseling regarding her condition or for health maintenance advice. Please see specific information pulled into the AVS if appropriate.

## 2022-11-04 ENCOUNTER — APPOINTMENT (OUTPATIENT)
Dept: LAB | Facility: HOSPITAL | Age: 27
End: 2022-11-04
Payer: COMMERCIAL

## 2022-11-07 ENCOUNTER — TELEPHONE (OUTPATIENT)
Dept: ONCOLOGY | Facility: CLINIC | Age: 27
End: 2022-11-07

## 2022-11-07 NOTE — TELEPHONE ENCOUNTER
Caller: Earl Maciel    Relationship to patient: Self    Best call back number: [phone]    Type of visit: LAB AND FOLLOW UP    Requested date: 11/10 OR 11/18    If rescheduling, when is the original appointment: 11/04    Additional notes: PLEASE CALL ONCE R/S.

## 2022-11-07 NOTE — TELEPHONE ENCOUNTER
TRIED CALLING PATIENT ABOUT HER APPT AND HER MAILBOX WAS FULL COULD NOT LEAVE A MESSAGE - MAILED APPT CARD OUT TO THE PATIENT

## 2022-11-16 ENCOUNTER — OFFICE VISIT (OUTPATIENT)
Dept: INTERNAL MEDICINE | Facility: CLINIC | Age: 27
End: 2022-11-16

## 2022-11-16 VITALS
HEIGHT: 60 IN | OXYGEN SATURATION: 100 % | DIASTOLIC BLOOD PRESSURE: 88 MMHG | TEMPERATURE: 98.4 F | HEART RATE: 62 BPM | WEIGHT: 198 LBS | SYSTOLIC BLOOD PRESSURE: 131 MMHG | BODY MASS INDEX: 38.87 KG/M2

## 2022-11-16 DIAGNOSIS — E66.09 CLASS 2 OBESITY DUE TO EXCESS CALORIES WITHOUT SERIOUS COMORBIDITY WITH BODY MASS INDEX (BMI) OF 38.0 TO 38.9 IN ADULT: Primary | ICD-10-CM

## 2022-11-16 PROBLEM — E66.812 CLASS 2 OBESITY DUE TO EXCESS CALORIES WITHOUT SERIOUS COMORBIDITY WITH BODY MASS INDEX (BMI) OF 38.0 TO 38.9 IN ADULT: Status: ACTIVE | Noted: 2022-11-16

## 2022-11-16 PROBLEM — H65.191 ACUTE EFFUSION OF RIGHT EAR: Status: RESOLVED | Noted: 2022-10-18 | Resolved: 2022-11-16

## 2022-11-16 PROBLEM — J02.9 SORE THROAT: Status: RESOLVED | Noted: 2022-10-18 | Resolved: 2022-11-16

## 2022-11-16 PROCEDURE — 99213 OFFICE O/P EST LOW 20 MIN: CPT | Performed by: NURSE PRACTITIONER

## 2022-11-16 RX ORDER — BUPROPION HYDROCHLORIDE 150 MG/1
150 TABLET ORAL DAILY
Qty: 90 TABLET | Refills: 0 | Status: SHIPPED | OUTPATIENT
Start: 2022-11-16 | End: 2022-12-05 | Stop reason: SDUPTHER

## 2022-11-16 NOTE — ASSESSMENT & PLAN NOTE
Patient's (Body mass index is 38.67 kg/m².) indicates that they are obese (BMI >30) with health conditions that include none . Weight is unchanged. BMI is is above average; BMI management plan is completed. We discussed portion control, increasing exercise, joining a fitness center or start home based exercise program, Weight Watchers or other Commercial based weight reduction program and pharmacologic options including wellbutrin, saxenda, wegovy.   Start wellbutrin 150mg XL daily.   Referral to dietician placed.

## 2022-11-16 NOTE — PROGRESS NOTES
"Chief Complaint  Weight Check    Subjective        Earl Maciel presents to [facility]  History of Present Illness  This is a 28 y/o female presenting to office for weight management. Patient currently follows vegan diet. Patient reports that she does not currently exercise. Patient reports she is interested in losing weight. Patient's current insurance does not cover saxenda or wegovy. We discussed using wellbutrin to help with appetite. Patient does currently follow with CBC for iron deficiency anemia.     Objective   Vital Signs:  /88 (BP Location: Right arm, Patient Position: Sitting, Cuff Size: Adult)   Pulse 62   Temp 98.4 °F (36.9 °C) (Infrared)   Ht 152.4 cm (60\")   Wt 89.8 kg (198 lb)   SpO2 100%   BMI 38.67 kg/m²   Estimated body mass index is 38.67 kg/m² as calculated from the following:    Height as of this encounter: 152.4 cm (60\").    Weight as of this encounter: 89.8 kg (198 lb).          Physical Exam  Constitutional:       Appearance: Normal appearance. She is obese.   HENT:      Head: Normocephalic and atraumatic.   Eyes:      Conjunctiva/sclera: Conjunctivae normal.      Pupils: Pupils are equal, round, and reactive to light.   Cardiovascular:      Rate and Rhythm: Normal rate and regular rhythm.      Pulses: Normal pulses.      Heart sounds: Normal heart sounds. No murmur heard.    No friction rub. No gallop.   Pulmonary:      Effort: Pulmonary effort is normal. No respiratory distress.      Breath sounds: Normal breath sounds. No stridor. No wheezing, rhonchi or rales.   Musculoskeletal:      Cervical back: Normal range of motion and neck supple.   Skin:     General: Skin is warm and dry.   Neurological:      General: No focal deficit present.      Mental Status: She is alert and oriented to person, place, and time. Mental status is at baseline.   Psychiatric:         Mood and Affect: Mood normal.         Thought Content: Thought content normal. "        Result Review :  The following data was reviewed by: SIA Mendenhall on 11/16/2022:  Common labs    Common Labs 8/31/22 8/31/22 8/31/22 8/31/22 9/8/22    0840 0840 0840 0840    Glucose   95     BUN   8     Creatinine   0.91     Sodium   138     Potassium   4.8     Chloride   103     Calcium   8.9     Total Protein   6.7     Albumin   4.0     Total Bilirubin   0.2     Alkaline Phosphatase   71     AST (SGOT)   14     ALT (SGPT)   12     WBC    3.1 (A) 4.93   Hemoglobin    7.9 (A) 8.8 (A)   Hematocrit    27.3 (A) 28.4 (A)   Platelets    455 (A) 277   Total Cholesterol  163      Triglycerides  70      HDL Cholesterol  39 (A)      LDL Cholesterol   110 (A)      Hemoglobin A1C 5.8 (A)       (A) Abnormal value       Comments are available for some flowsheets but are not being displayed.                     Assessment and Plan   Diagnoses and all orders for this visit:    1. Class 2 obesity due to excess calories without serious comorbidity with body mass index (BMI) of 38.0 to 38.9 in adult (Primary)  Assessment & Plan:  Patient's (Body mass index is 38.67 kg/m².) indicates that they are obese (BMI >30) with health conditions that include none . Weight is unchanged. BMI is is above average; BMI management plan is completed. We discussed portion control, increasing exercise, joining a fitness center or start home based exercise program, Weight Watchers or other Commercial based weight reduction program and pharmacologic options including wellbutrin, saxenda, wegovy.   Start wellbutrin 150mg XL daily.   Referral to dietician placed.     Orders:  -     Ambulatory Referral to Nutrition Services  -     buPROPion XL (Wellbutrin XL) 150 MG 24 hr tablet; Take 1 tablet by mouth Daily.  Dispense: 90 tablet; Refill: 0           Follow Up   Return in about 4 weeks (around 12/14/2022) for Recheck weight management.  Patient was given instructions and counseling regarding her condition or for health maintenance advice.  Please see specific information pulled into the AVS if appropriate.

## 2022-12-02 ENCOUNTER — OFFICE VISIT (OUTPATIENT)
Dept: OBSTETRICS AND GYNECOLOGY | Age: 27
End: 2022-12-02

## 2022-12-02 VITALS
BODY MASS INDEX: 39.54 KG/M2 | SYSTOLIC BLOOD PRESSURE: 116 MMHG | HEIGHT: 60 IN | DIASTOLIC BLOOD PRESSURE: 78 MMHG | WEIGHT: 201.4 LBS

## 2022-12-02 DIAGNOSIS — Z01.419 WELL WOMAN EXAM WITH ROUTINE GYNECOLOGICAL EXAM: Primary | ICD-10-CM

## 2022-12-02 DIAGNOSIS — N92.0 MENORRHAGIA WITH REGULAR CYCLE: ICD-10-CM

## 2022-12-02 DIAGNOSIS — Z12.4 SCREENING FOR CERVICAL CANCER: ICD-10-CM

## 2022-12-02 DIAGNOSIS — D50.0 IRON DEFICIENCY ANEMIA DUE TO CHRONIC BLOOD LOSS: ICD-10-CM

## 2022-12-02 DIAGNOSIS — E66.9 OBESITY (BMI 30-39.9): ICD-10-CM

## 2022-12-02 PROCEDURE — 99395 PREV VISIT EST AGE 18-39: CPT | Performed by: NURSE PRACTITIONER

## 2022-12-02 NOTE — PROGRESS NOTES
Subjective     Chief Complaint   Patient presents with   • Gynecologic Exam     New pt, Last AE 2 to 3 yrs ago. Pt c/o of heavy menses    • Establish Care       History of Present Illness    Earl Maciel is a 27 y.o.  who presents for annual exam.    New GYN  Here for annual exam   Due for pap smear  She has menorrhagia and sees hematology for iron deficiency anemia. Has received iron infusions x 4. Has follow up .  She recently had STD screening with PCP, declines today  She is SA with female partner  Her menses are regular every 28-30 days, lasting 4-7 days, dysmenorrhea none   Periods are very heavy the first couple of days, has clots approx quarter to half dollar size   Was on nexplanon for 3 years after delivery of her son but has not been on anything since then  Discussed menstrual cycles likely major cause of iron def anemia and birth control will help lighten cycles and reduce severity of anemia  She declines today but will think about    Obstetric History:  OB History        1    Para   1    Term   1            AB        Living   1       SAB        IAB        Ectopic        Molar        Multiple        Live Births   1               Menstrual History:     Patient's last menstrual period was 11/10/2022 (exact date).         Current contraception: Same sex partner  History of abnormal Pap smear: no  Received Gardasil immunization: yes  Perform regular self breast exam: yes - occl  Family history of uterine or ovarian cancer: no  Family History of colon cancer: no  Family history of breast cancer: no    Mammogram: not indicated.  Colonoscopy: not indicated.  DEXA: not indicated.    Exercise: moderately active or not active  Calcium/Vitamin D: adequate intake    The following portions of the patient's history were reviewed and updated as appropriate: allergies, current medications, past family history, past medical history, past social history, past surgical history and problem  list.    Review of Systems   Constitutional: Negative.    Respiratory: Negative.    Cardiovascular: Negative.    Gastrointestinal: Negative.    Genitourinary: Positive for menstrual problem.   Skin: Negative.    Psychiatric/Behavioral: Negative.            Objective   Physical Exam  Constitutional:       General: She is awake.      Appearance: Normal appearance. She is well-developed. She is obese.   HENT:      Head: Normocephalic and atraumatic.      Nose: Nose normal.   Neck:      Thyroid: No thyroid mass, thyromegaly or thyroid tenderness.   Cardiovascular:      Rate and Rhythm: Normal rate and regular rhythm.      Pulses: Normal pulses.      Heart sounds: Normal heart sounds.   Pulmonary:      Effort: Pulmonary effort is normal.      Breath sounds: Normal breath sounds.   Chest:   Breasts:     Breasts are symmetrical.      Right: Normal. No swelling, bleeding, inverted nipple, mass, nipple discharge, skin change or tenderness.      Left: Normal. No swelling, bleeding, inverted nipple, mass, nipple discharge, skin change or tenderness.   Abdominal:      General: Abdomen is flat. Bowel sounds are normal.      Palpations: Abdomen is soft.      Tenderness: There is no abdominal tenderness.   Genitourinary:     General: Normal vulva.      Labia:         Right: No rash, tenderness, lesion or injury.         Left: No rash, tenderness, lesion or injury.       Urethra: No prolapse, urethral pain, urethral swelling or urethral lesion.      Vagina: Normal. No signs of injury. No vaginal discharge, erythema, tenderness, bleeding, lesions or prolapsed vaginal walls.      Cervix: No discharge, friability, lesion, erythema or cervical bleeding.      Uterus: Normal. Not enlarged, not tender and no uterine prolapse.       Adnexa: Right adnexa normal and left adnexa normal.        Right: No mass, tenderness or fullness.          Left: No mass, tenderness or fullness.        Rectum: Normal. No mass.      Comments: Exam limited by  "body habitus   Musculoskeletal:      Cervical back: Normal range of motion and neck supple.   Lymphadenopathy:      Upper Body:      Right upper body: No supraclavicular adenopathy.      Left upper body: No supraclavicular adenopathy.   Skin:     General: Skin is warm and dry.   Neurological:      General: No focal deficit present.      Mental Status: She is alert and oriented to person, place, and time.   Psychiatric:         Mood and Affect: Mood normal.         Behavior: Behavior normal. Behavior is cooperative.         Thought Content: Thought content normal.         Judgment: Judgment normal.         /78   Ht 152.4 cm (60\")   Wt 91.4 kg (201 lb 6.4 oz)   LMP 11/10/2022 (Exact Date)   BMI 39.33 kg/m²     Assessment & Plan   Diagnoses and all orders for this visit:    1. Well woman exam with routine gynecological exam (Primary)    2. Screening for cervical cancer  -     IGP, Rfx Aptima HPV ASCU    3. Obesity (BMI 30-39.9)    4. Menorrhagia with regular cycle    5. Iron deficiency anemia due to chronic blood loss        All questions answered.  Breast self exam technique reviewed and patient encouraged to perform self-exam monthly.  Discussed healthy lifestyle modifications.  Recommended 30 minutes of aerobic exercise five times per week.  Discussed calcium needs to prevent osteoporosis.    -Pap today  -Declines any hormonal treatment for heavy menses at this time. Info given on IUD, nexplanon. Also discussed OCP's as option.  -F/u 1 year, sooner prn                "

## 2022-12-05 DIAGNOSIS — E66.09 CLASS 2 OBESITY DUE TO EXCESS CALORIES WITHOUT SERIOUS COMORBIDITY WITH BODY MASS INDEX (BMI) OF 38.0 TO 38.9 IN ADULT: ICD-10-CM

## 2022-12-06 RX ORDER — BUPROPION HYDROCHLORIDE 150 MG/1
150 TABLET ORAL DAILY
Qty: 90 TABLET | Refills: 1 | Status: SHIPPED | OUTPATIENT
Start: 2022-12-06

## 2022-12-07 ENCOUNTER — LAB (OUTPATIENT)
Dept: LAB | Facility: HOSPITAL | Age: 27
End: 2022-12-07
Payer: COMMERCIAL

## 2022-12-07 ENCOUNTER — OFFICE VISIT (OUTPATIENT)
Dept: ONCOLOGY | Facility: CLINIC | Age: 27
End: 2022-12-07

## 2022-12-07 VITALS
TEMPERATURE: 97.7 F | DIASTOLIC BLOOD PRESSURE: 82 MMHG | RESPIRATION RATE: 16 BRPM | BODY MASS INDEX: 39.95 KG/M2 | HEIGHT: 60 IN | SYSTOLIC BLOOD PRESSURE: 135 MMHG | HEART RATE: 67 BPM | OXYGEN SATURATION: 100 % | WEIGHT: 203.5 LBS

## 2022-12-07 DIAGNOSIS — D50.0 IRON DEFICIENCY ANEMIA DUE TO CHRONIC BLOOD LOSS: Primary | ICD-10-CM

## 2022-12-07 DIAGNOSIS — D50.0 IRON DEFICIENCY ANEMIA DUE TO CHRONIC BLOOD LOSS: ICD-10-CM

## 2022-12-07 LAB
BASOPHILS # BLD AUTO: 0.05 10*3/MM3 (ref 0–0.2)
BASOPHILS NFR BLD AUTO: 1 % (ref 0–1.5)
DEPRECATED RDW RBC AUTO: 49.3 FL (ref 37–54)
EOSINOPHIL # BLD AUTO: 0.32 10*3/MM3 (ref 0–0.4)
EOSINOPHIL NFR BLD AUTO: 6.7 % (ref 0.3–6.2)
ERYTHROCYTE [DISTWIDTH] IN BLOOD BY AUTOMATED COUNT: 17.7 % (ref 12.3–15.4)
FERRITIN SERPL-MCNC: 151.2 NG/ML (ref 11–207)
HAPTOGLOB SERPL-MCNC: 142 MG/DL (ref 30–200)
HCT VFR BLD AUTO: 34.6 % (ref 34–46.6)
HGB BLD-MCNC: 10.8 G/DL (ref 12–15.9)
IMM GRANULOCYTES # BLD AUTO: 0.01 10*3/MM3 (ref 0–0.05)
IMM GRANULOCYTES NFR BLD AUTO: 0.2 % (ref 0–0.5)
IRON 24H UR-MRATE: 30 MCG/DL (ref 37–145)
IRON SATN MFR SERPL: 11 % (ref 14–48)
LDH SERPL-CCNC: 126 U/L (ref 99–259)
LYMPHOCYTES # BLD AUTO: 1.6 10*3/MM3 (ref 0.7–3.1)
LYMPHOCYTES NFR BLD AUTO: 33.5 % (ref 19.6–45.3)
MCH RBC QN AUTO: 23.8 PG (ref 26.6–33)
MCHC RBC AUTO-ENTMCNC: 31.2 G/DL (ref 31.5–35.7)
MCV RBC AUTO: 76.4 FL (ref 79–97)
MONOCYTES # BLD AUTO: 0.51 10*3/MM3 (ref 0.1–0.9)
MONOCYTES NFR BLD AUTO: 10.7 % (ref 5–12)
NEUTROPHILS NFR BLD AUTO: 2.29 10*3/MM3 (ref 1.7–7)
NEUTROPHILS NFR BLD AUTO: 47.9 % (ref 42.7–76)
NRBC BLD AUTO-RTO: 0 /100 WBC (ref 0–0.2)
PLATELET # BLD AUTO: 329 10*3/MM3 (ref 140–450)
PMV BLD AUTO: 10.5 FL (ref 6–12)
RBC # BLD AUTO: 4.53 10*6/MM3 (ref 3.77–5.28)
TIBC SERPL-MCNC: 277 MCG/DL (ref 249–505)
TRANSFERRIN SERPL-MCNC: 198 MG/DL (ref 200–360)
WBC NRBC COR # BLD: 4.78 10*3/MM3 (ref 3.4–10.8)

## 2022-12-07 PROCEDURE — 82728 ASSAY OF FERRITIN: CPT

## 2022-12-07 PROCEDURE — 84466 ASSAY OF TRANSFERRIN: CPT

## 2022-12-07 PROCEDURE — 83010 ASSAY OF HAPTOGLOBIN QUANT: CPT | Performed by: INTERNAL MEDICINE

## 2022-12-07 PROCEDURE — 85025 COMPLETE CBC W/AUTO DIFF WBC: CPT

## 2022-12-07 PROCEDURE — 83540 ASSAY OF IRON: CPT

## 2022-12-07 PROCEDURE — 36415 COLL VENOUS BLD VENIPUNCTURE: CPT

## 2022-12-07 PROCEDURE — 99214 OFFICE O/P EST MOD 30 MIN: CPT | Performed by: INTERNAL MEDICINE

## 2022-12-07 PROCEDURE — 83615 LACTATE (LD) (LDH) ENZYME: CPT | Performed by: INTERNAL MEDICINE

## 2022-12-07 NOTE — PROGRESS NOTES
[facility]    CONSULTING IN BLOOD DISORDERS & CANCER      REASON FOR CONSULTATION/CHIEF COMPLAINT:     Evaluation and management for iron deficiency anemia                             REQUESTING PHYSICIAN: No ref. provider found  RECORDS OBTAINED:  Records of the patients history including those from the electronic medical record were reviewed and summarized in detail.    HISTORY OF PRESENT ILLNESS:    The patient is a 27 y.o. year old female is a pleasant 27-year-old -American female with medical history significant for iron deficiency anemia and postpartum hypothyroidism who had presented to her PCP with fatigue and mental fogginess and August 2022.  She was noted to have severe anemia with hemoglobin of 7.9 on the CBC from 8/31/2022.  WBC count of 3.1, hematocrit of 27.3, MCV 68 and platelet count of 455,000.  Additional work-up showed severe iron deficiency with iron level of 17 mcg/dL and ferritin 7 ng/ml.  Vitamin B12 and folate levels were within normal range.   Patient was referred to hematology for further evaluation and management.    Patient states she was first noted to have iron deficiency anemia during her pregnancy in 2015.  She had received iron infusions during that time.  Patient states she has always had heavy menstrual bleed.  Denies taking any oral contraceptive pills.      Denies any blood in stool or urine.  She does report of eating regular diet, however she is trying to not eat any meat products.  Denies any family history of sickle cell disease or any other hemoglobinopathies.  Denies alcohol, tobacco or drug abuse.  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    Given severe iron deficiency, patient received IV Venofer 300 mg x 4 doses in September - October 2022.    12/7/2022: Hemoglobin improved to 10.8.    INTERIM HISTORY:  Patient returns to the clinic for a follow-up visit.  Patient had tolerated IV iron infusions well.  Does report of improvement in  "energy level.  Continues to have heavy and irregular menstrual cycles.  Patient was seen by gynecology recently and is being evaluated for hormonal treatment.  Says she eats regular diet, however does not eat red meats.    PMH/PSH:  -History of iron deficiency  - Postpartum hypothyroidism  -History of syphilis    No past medical history on file.  Past Surgical History:   Procedure Laterality Date   •  SECTION     • WISDOM TOOTH EXTRACTION         MEDICATIONS    Current Outpatient Medications:   •  buPROPion XL (Wellbutrin XL) 150 MG 24 hr tablet, Take 1 tablet by mouth Daily., Disp: 90 tablet, Rfl: 1    ALLERGIES:   No Known Allergies    SOCIAL HISTORY:       Social History     Socioeconomic History   • Marital status: Single   Tobacco Use   • Smoking status: Never   • Smokeless tobacco: Never   Substance and Sexual Activity   • Alcohol use: Yes     Comment: occ   • Drug use: Never   • Sexual activity: Yes     Partners: Female         FAMILY HISTORY:  Family History   Problem Relation Age of Onset   • No Known Problems Mother    • No Known Problems Father        REVIEW OF SYSTEMS:  Constitutional: [No fevers, chills, sweats]  Eye: [No recent visual problems]  ENMT: [No ear pain, nasal congestion, sore throat]  Respiratory: [No shortness of breath, cough]  Cardiovascular: [No Chest pain, palpitations, syncope]  Gastrointestinal: [No nausea, vomiting, diarrhea]  Genitourinary: [No hematuria]  Hema/Lymph: [Negative for bruising tendency, swollen lymph glands]  Endocrine: [Negative for excessive thirst, excessive hunger]  Musculoskeletal: [Denies any musculoskeletal pain or swelling]  Integumentary: [No rash, pruritus, abrasions]  Neurologic: [ No weakness or numbness, Alert & oriented X 4]         Vitals:    22 1624   BP: 135/82   Pulse: 67   Resp: 16   Temp: 97.7 °F (36.5 °C)   TempSrc: Temporal   SpO2: 100%   Weight: 92.3 kg (203 lb 8 oz)   Height: 152.4 cm (60\")   PainSc: 0-No pain     Current Status " 12/7/2022   ECOG score 0      PHYSICAL EXAM:    CONSTITUTIONAL:  Vital signs reviewed.  No distress, looks comfortable.  EYES:  Conjunctiva and lids unremarkable.   EARS,NOSE,MOUTH,THROAT:  Ears and nose appear unremarkable.  Lips, teeth, gums appear unremarkable.  RESPIRATORY:  Normal respiratory effort.  Lungs clear to auscultation bilaterally.  CARDIOVASCULAR:  Normal S1, S2.  No murmurs rubs or gallops.  No significant lower extremity edema.  GASTROINTESTINAL: Abdomen appears unremarkable.  Nondistended LYMPHATIC:  No cervical, supraclavicular lymphadenopathy.  NEURO: AAOx3, no focal deficits.  Appears to have equal strength all 4 extremities.  MUSCULOSKELETAL:  Unremarkable digits/nails.  No cyanosis or clubbing.  No apparent joint deformities.  SKIN:  Warm.  No rashes.  PSYCHIATRIC:  Normal judgment and insight.  Normal mood and affect.     RECENT LABS:        Lab on 12/07/2022   Component Date Value Ref Range Status   • WBC 12/07/2022 4.78  3.40 - 10.80 10*3/mm3 Final   • RBC 12/07/2022 4.53  3.77 - 5.28 10*6/mm3 Final   • Hemoglobin 12/07/2022 10.8 (L)  12.0 - 15.9 g/dL Final   • Hematocrit 12/07/2022 34.6  34.0 - 46.6 % Final   • MCV 12/07/2022 76.4 (L)  79.0 - 97.0 fL Final   • MCH 12/07/2022 23.8 (L)  26.6 - 33.0 pg Final   • MCHC 12/07/2022 31.2 (L)  31.5 - 35.7 g/dL Final   • RDW 12/07/2022 17.7 (H)  12.3 - 15.4 % Final   • RDW-SD 12/07/2022 49.3  37.0 - 54.0 fl Final   • MPV 12/07/2022 10.5  6.0 - 12.0 fL Final   • Platelets 12/07/2022 329  140 - 450 10*3/mm3 Final   • Neutrophil % 12/07/2022 47.9  42.7 - 76.0 % Final   • Lymphocyte % 12/07/2022 33.5  19.6 - 45.3 % Final   • Monocyte % 12/07/2022 10.7  5.0 - 12.0 % Final   • Eosinophil % 12/07/2022 6.7 (H)  0.3 - 6.2 % Final   • Basophil % 12/07/2022 1.0  0.0 - 1.5 % Final   • Immature Grans % 12/07/2022 0.2  0.0 - 0.5 % Final   • Neutrophils, Absolute 12/07/2022 2.29  1.70 - 7.00 10*3/mm3 Final   • Lymphocytes, Absolute 12/07/2022 1.60  0.70 - 3.10  10*3/mm3 Final   • Monocytes, Absolute 12/07/2022 0.51  0.10 - 0.90 10*3/mm3 Final   • Eosinophils, Absolute 12/07/2022 0.32  0.00 - 0.40 10*3/mm3 Final   • Basophils, Absolute 12/07/2022 0.05  0.00 - 0.20 10*3/mm3 Final   • Immature Grans, Absolute 12/07/2022 0.01  0.00 - 0.05 10*3/mm3 Final   • nRBC 12/07/2022 0.0  0.0 - 0.2 /100 WBC Final     ASSESSMENT:   is a pleasant 27-year-old -American female with medical history significant for iron deficiency anemia and postpartum hypothyroidism who comes for iron deficiency anemia evaluation and management.    #Microcytic anemia:   · Hemoglobin 7.9, hematocrit 27 & MCV 68.  Additional work-up showed severe deficiency with iron level of 17 & ferritin 7 ng/ml.  Normal vitamin B12 and folate.  · Peripheral smear review performed by me in clinic shows microcytic RBCs, no increased schistocytes, no blasts or increased immature cells, adequate platelets.     · Likely related to heavy menstrual bleed and subsequent deficiency.  Patient denies any blood loss in urine or stool.  Reports of eating adequate diet.  No family history of sickle cell disease or any other hemoglobinopathies.  · Given severe iron deficiency anemia, patient received IV iron 300 mg x 4 doses in September - October 2022.   · 12/7/2022: Hemoglobin improved to 10.8 g/dL.  Await iron profile and ferritin results.  Patient continues to have heavy menstrual bleed.  Patient was advised to continue taking oral iron supplementation and maintain adequate diet rich in iron.  We will plan to repeat her labs in 3 months time or sooner if needed.    #Thrombocytosis: Likely ROSA related.  Resolved with iron replacement    #Heavy menstrual bleed: Was seen by OB/GYN in December 2022.  Patient is being evaluated for hormone treatment.  Advised close follow-up with OB/GYN.    PLAN:   -Iron deficiency anemia related to heavy menstrual bleed.  Some improvement in hemoglobin with IV Venofer 300 mg x 4 doses in  September - October 2022.  -Advised to take oral iron supplementation daily.  Iron rich food.  -Advised close follow-up with OB/GYN for heavy menstrual bleed management.  - F/u in 3 months with repeat labs or sooner if needed    Orders Placed This Encounter   Procedures   • Iron Profile     Standing Status:   Future     Number of Occurrences:   1     Standing Expiration Date:   12/6/2023     Order Specific Question:   Release to patient     Answer:   Routine Release   • Ferritin     Standing Status:   Future     Number of Occurrences:   1     Standing Expiration Date:   12/6/2023     Order Specific Question:   Release to patient     Answer:   Routine Release   • CBC & Differential     Standing Status:   Future     Number of Occurrences:   1     Standing Expiration Date:   12/6/2023     Order Specific Question:   Manual Differential     Answer:   No

## 2022-12-08 LAB
HGB A MFR BLD ELPH: 97.3 % (ref 96.4–98.8)
HGB A2 MFR BLD ELPH: 2.7 % (ref 1.8–3.2)
HGB F MFR BLD ELPH: 0 % (ref 0–2)
HGB FRACT BLD-IMP: NORMAL
HGB S MFR BLD ELPH: 0 %

## 2022-12-11 LAB
CONV .: NORMAL
CYTOLOGIST CVX/VAG CYTO: NORMAL
CYTOLOGY CVX/VAG DOC CYTO: NORMAL
CYTOLOGY CVX/VAG DOC THIN PREP: NORMAL
DX ICD CODE: NORMAL
HIV 1 & 2 AB SER-IMP: NORMAL
Lab: NORMAL
OTHER STN SPEC: NORMAL
STAT OF ADQ CVX/VAG CYTO-IMP: NORMAL

## 2023-01-23 ENCOUNTER — OFFICE VISIT (OUTPATIENT)
Dept: INTERNAL MEDICINE | Facility: CLINIC | Age: 28
End: 2023-01-23
Payer: COMMERCIAL

## 2023-01-23 VITALS
HEIGHT: 60 IN | OXYGEN SATURATION: 97 % | HEART RATE: 77 BPM | TEMPERATURE: 97 F | BODY MASS INDEX: 41.62 KG/M2 | DIASTOLIC BLOOD PRESSURE: 80 MMHG | SYSTOLIC BLOOD PRESSURE: 130 MMHG | WEIGHT: 212 LBS

## 2023-01-23 DIAGNOSIS — N63.25 MASS OVERLAPPING MULTIPLE QUADRANTS OF LEFT BREAST: Primary | ICD-10-CM

## 2023-01-23 PROCEDURE — 99213 OFFICE O/P EST LOW 20 MIN: CPT | Performed by: NURSE PRACTITIONER

## 2023-01-23 NOTE — PROGRESS NOTES
"Chief Complaint  Breast Mass (Left )    Subjective        Earl Maciel presents to [facility]  History of Present Illness  This is a 29 y/o female presenting to office for complaints of left breast nodule. Reports she first noticed this a couple weeks ago. Patient reports some tenderness to the left breast. Reports hx of paternal grandmother having breast cancer. Denies any discharge or skin changes.     Objective   Vital Signs:  /80 (BP Location: Right arm, Patient Position: Sitting, Cuff Size: Large Adult)   Pulse 77   Temp 97 °F (36.1 °C) (Infrared)   Ht 152.4 cm (60\")   Wt 96.2 kg (212 lb)   SpO2 97%   BMI 41.40 kg/m²   Estimated body mass index is 41.4 kg/m² as calculated from the following:    Height as of this encounter: 152.4 cm (60\").    Weight as of this encounter: 96.2 kg (212 lb).             Physical Exam  Constitutional:       Appearance: Normal appearance.   HENT:      Head: Normocephalic and atraumatic.      Right Ear: External ear normal.      Left Ear: External ear normal.   Eyes:      Conjunctiva/sclera: Conjunctivae normal.      Pupils: Pupils are equal, round, and reactive to light.   Pulmonary:      Effort: Pulmonary effort is normal.   Chest:       Musculoskeletal:         General: Normal range of motion.   Skin:     General: Skin is warm and dry.   Neurological:      General: No focal deficit present.      Mental Status: She is alert and oriented to person, place, and time. Mental status is at baseline.   Psychiatric:         Mood and Affect: Mood normal.         Thought Content: Thought content normal.         Judgment: Judgment normal.        Result Review :  The following data was reviewed by: SIA Mendenhall on 01/23/2023:  Common labs    Common Labs 8/31/22 8/31/22 8/31/22 8/31/22 9/8/22 12/7/22    0840 0840 0840 0840     Glucose   95      BUN   8      Creatinine   0.91      Sodium   138      Potassium   4.8      Chloride   103    "   Calcium   8.9      Total Protein   6.7      Albumin   4.0      Total Bilirubin   0.2      Alkaline Phosphatase   71      AST (SGOT)   14      ALT (SGPT)   12      WBC    3.1 (A) 4.93 4.78   Hemoglobin    7.9 (A) 8.8 (A) 10.8 (A)   Hematocrit    27.3 (A) 28.4 (A) 34.6   Platelets    455 (A) 277 329   Total Cholesterol  163       Triglycerides  70       HDL Cholesterol  39 (A)       LDL Cholesterol   110 (A)       Hemoglobin A1C 5.8 (A)        (A) Abnormal value       Comments are available for some flowsheets but are not being displayed.                        Assessment and Plan   Diagnoses and all orders for this visit:    1. Mass overlapping multiple quadrants of left breast (Primary)  -     Mammo Diagnostic Left With CAD; Future             Follow Up   Return in about 7 months (around 8/23/2023) for Annual physical.  Patient was given instructions and counseling regarding her condition or for health maintenance advice. Please see specific information pulled into the AVS if appropriate.

## 2023-02-03 ENCOUNTER — APPOINTMENT (OUTPATIENT)
Dept: WOMENS IMAGING | Facility: HOSPITAL | Age: 28
End: 2023-02-03
Payer: COMMERCIAL

## 2023-02-03 PROCEDURE — 76642 ULTRASOUND BREAST LIMITED: CPT | Performed by: RADIOLOGY

## 2023-03-14 ENCOUNTER — OFFICE VISIT (OUTPATIENT)
Dept: ONCOLOGY | Facility: CLINIC | Age: 28
End: 2023-03-14
Payer: COMMERCIAL

## 2023-03-14 ENCOUNTER — LAB (OUTPATIENT)
Dept: LAB | Facility: HOSPITAL | Age: 28
End: 2023-03-14
Payer: COMMERCIAL

## 2023-03-14 VITALS
OXYGEN SATURATION: 100 % | SYSTOLIC BLOOD PRESSURE: 136 MMHG | HEIGHT: 60 IN | BODY MASS INDEX: 41.23 KG/M2 | RESPIRATION RATE: 16 BRPM | HEART RATE: 79 BPM | DIASTOLIC BLOOD PRESSURE: 82 MMHG | TEMPERATURE: 98.4 F | WEIGHT: 210 LBS

## 2023-03-14 DIAGNOSIS — D50.0 IRON DEFICIENCY ANEMIA DUE TO CHRONIC BLOOD LOSS: ICD-10-CM

## 2023-03-14 DIAGNOSIS — D50.0 IRON DEFICIENCY ANEMIA DUE TO CHRONIC BLOOD LOSS: Primary | ICD-10-CM

## 2023-03-14 LAB
BASOPHILS # BLD AUTO: 0.04 10*3/MM3 (ref 0–0.2)
BASOPHILS NFR BLD AUTO: 0.7 % (ref 0–1.5)
DEPRECATED RDW RBC AUTO: 40 FL (ref 37–54)
EOSINOPHIL # BLD AUTO: 0.22 10*3/MM3 (ref 0–0.4)
EOSINOPHIL NFR BLD AUTO: 3.6 % (ref 0.3–6.2)
ERYTHROCYTE [DISTWIDTH] IN BLOOD BY AUTOMATED COUNT: 14.6 % (ref 12.3–15.4)
FERRITIN SERPL-MCNC: 109.3 NG/ML (ref 11–207)
HCT VFR BLD AUTO: 35.6 % (ref 34–46.6)
HGB BLD-MCNC: 11.3 G/DL (ref 12–15.9)
IMM GRANULOCYTES # BLD AUTO: 0.01 10*3/MM3 (ref 0–0.05)
IMM GRANULOCYTES NFR BLD AUTO: 0.2 % (ref 0–0.5)
IRON 24H UR-MRATE: 56 MCG/DL (ref 37–145)
IRON SATN MFR SERPL: 17 % (ref 14–48)
LYMPHOCYTES # BLD AUTO: 2.33 10*3/MM3 (ref 0.7–3.1)
LYMPHOCYTES NFR BLD AUTO: 38.2 % (ref 19.6–45.3)
MCH RBC QN AUTO: 24.5 PG (ref 26.6–33)
MCHC RBC AUTO-ENTMCNC: 31.7 G/DL (ref 31.5–35.7)
MCV RBC AUTO: 77.2 FL (ref 79–97)
MONOCYTES # BLD AUTO: 0.7 10*3/MM3 (ref 0.1–0.9)
MONOCYTES NFR BLD AUTO: 11.5 % (ref 5–12)
NEUTROPHILS NFR BLD AUTO: 2.8 10*3/MM3 (ref 1.7–7)
NEUTROPHILS NFR BLD AUTO: 45.8 % (ref 42.7–76)
NRBC BLD AUTO-RTO: 0 /100 WBC (ref 0–0.2)
PLATELET # BLD AUTO: 363 10*3/MM3 (ref 140–450)
PMV BLD AUTO: 10.1 FL (ref 6–12)
RBC # BLD AUTO: 4.61 10*6/MM3 (ref 3.77–5.28)
TIBC SERPL-MCNC: 337 MCG/DL (ref 249–505)
TRANSFERRIN SERPL-MCNC: 241 MG/DL (ref 200–360)
WBC NRBC COR # BLD: 6.1 10*3/MM3 (ref 3.4–10.8)

## 2023-03-14 PROCEDURE — 85025 COMPLETE CBC W/AUTO DIFF WBC: CPT

## 2023-03-14 PROCEDURE — 84466 ASSAY OF TRANSFERRIN: CPT

## 2023-03-14 PROCEDURE — 83540 ASSAY OF IRON: CPT

## 2023-03-14 PROCEDURE — 99213 OFFICE O/P EST LOW 20 MIN: CPT | Performed by: INTERNAL MEDICINE

## 2023-03-14 PROCEDURE — 82728 ASSAY OF FERRITIN: CPT

## 2023-03-14 PROCEDURE — 36415 COLL VENOUS BLD VENIPUNCTURE: CPT

## 2023-03-14 PROCEDURE — 1126F AMNT PAIN NOTED NONE PRSNT: CPT | Performed by: INTERNAL MEDICINE

## 2023-03-14 NOTE — PROGRESS NOTES
[facility]    CONSULTING IN BLOOD DISORDERS & CANCER      REASON FOR CONSULTATION/CHIEF COMPLAINT:     Evaluation and management for iron deficiency anemia                             REQUESTING PHYSICIAN: No ref. provider found  RECORDS OBTAINED:  Records of the patients history including those from the electronic medical record were reviewed and summarized in detail.    HISTORY OF PRESENT ILLNESS:    The patient is a 28 y.o. year old female is a pleasant 27-year-old -American female with medical history significant for iron deficiency anemia and postpartum hypothyroidism who had presented to her PCP with fatigue and mental fogginess and August 2022.  She was noted to have severe anemia with hemoglobin of 7.9 on the CBC from 8/31/2022.  WBC count of 3.1, hematocrit of 27.3, MCV 68 and platelet count of 455,000.  Additional work-up showed severe iron deficiency with iron level of 17 mcg/dL and ferritin 7 ng/ml.  Vitamin B12 and folate levels were within normal range.   Patient was referred to hematology for further evaluation and management.    Patient states she was first noted to have iron deficiency anemia during her pregnancy in 2015.  She had received iron infusions during that time.  Patient states she has always had heavy menstrual bleed.  Denies taking any oral contraceptive pills.      Denies any blood in stool or urine.  She does report of eating regular diet, however she is trying to not eat any meat products.  Denies any family history of sickle cell disease or any other hemoglobinopathies.  Denies alcohol, tobacco or drug abuse.  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    Given severe iron deficiency, patient received IV Venofer 300 mg x 4 doses in September - October 2022.    12/7/2022: Hemoglobin improved to 10.8.    INTERIM HISTORY:  Patient returns to the clinic for a follow-up visit.  Patient had tolerated IV iron infusions well.  Does report of improvement in  "energy level.      Taking over-the-counter Heme Plex/liquid iron replacement.  Tolerating it well.  Also trying to eat more iron in her diet.  Menstrual cycles not as heavy.  Follows up with gynecology.  Follow-up in 3-4 months with labs    PMH/PSH:  -History of iron deficiency  - Postpartum hypothyroidism  -History of syphilis    No past medical history on file.  Past Surgical History:   Procedure Laterality Date   •  SECTION     • WISDOM TOOTH EXTRACTION         MEDICATIONS    Current Outpatient Medications:   •  buPROPion XL (Wellbutrin XL) 150 MG 24 hr tablet, Take 1 tablet by mouth Daily., Disp: 90 tablet, Rfl: 1    ALLERGIES:   No Known Allergies    SOCIAL HISTORY:       Social History     Socioeconomic History   • Marital status: Single   Tobacco Use   • Smoking status: Never   • Smokeless tobacco: Never   Substance and Sexual Activity   • Alcohol use: Yes     Comment: occ   • Drug use: Never   • Sexual activity: Yes     Partners: Female         FAMILY HISTORY:  Family History   Problem Relation Age of Onset   • No Known Problems Mother    • No Known Problems Father        REVIEW OF SYSTEMS:  As per HPI         Vitals:    23 1634   BP: 136/82   Pulse: 79   Resp: 16   Temp: 98.4 °F (36.9 °C)   TempSrc: Temporal   SpO2: 100%   Weight: 95.3 kg (210 lb)   Height: 152.4 cm (60\")   PainSc: 0-No pain     Current Status 3/14/2023   ECOG score 0      PHYSICAL EXAM:    CONSTITUTIONAL:  Vital signs reviewed.  No distress, looks comfortable.  EYES:  Conjunctiva and lids unremarkable.   EARS,NOSE,MOUTH,THROAT:  Ears and nose appear unremarkable.  Lips, teeth, gums appear unremarkable.  RESPIRATORY:  Normal respiratory effort.  Lungs clear to auscultation bilaterally.  CARDIOVASCULAR:  Normal S1, S2.  No murmurs rubs or gallops.  No significant lower extremity edema  GASTROINTESTINAL: Abdomen appears unremarkable.  Nondistended LYMPHATIC:  No cervical, supraclavicular lymphadenopathy.  NEURO: AAOx3, no focal " deficits.  Appears to have equal strength all 4 extremities.  MUSCULOSKELETAL:  Unremarkable digits/nails.  No cyanosis or clubbing.  No apparent joint deformities.  SKIN:  Warm.  No rashes.  PSYCHIATRIC:  Normal judgment and insight.  Normal mood and affect.     RECENT LABS:        Lab on 03/14/2023   Component Date Value Ref Range Status   • WBC 03/14/2023 6.10  3.40 - 10.80 10*3/mm3 Final   • RBC 03/14/2023 4.61  3.77 - 5.28 10*6/mm3 Final   • Hemoglobin 03/14/2023 11.3 (L)  12.0 - 15.9 g/dL Final   • Hematocrit 03/14/2023 35.6  34.0 - 46.6 % Final   • MCV 03/14/2023 77.2 (L)  79.0 - 97.0 fL Final   • MCH 03/14/2023 24.5 (L)  26.6 - 33.0 pg Final   • MCHC 03/14/2023 31.7  31.5 - 35.7 g/dL Final   • RDW 03/14/2023 14.6  12.3 - 15.4 % Final   • RDW-SD 03/14/2023 40.0  37.0 - 54.0 fl Final   • MPV 03/14/2023 10.1  6.0 - 12.0 fL Final   • Platelets 03/14/2023 363  140 - 450 10*3/mm3 Final   • Neutrophil % 03/14/2023 45.8  42.7 - 76.0 % Final   • Lymphocyte % 03/14/2023 38.2  19.6 - 45.3 % Final   • Monocyte % 03/14/2023 11.5  5.0 - 12.0 % Final   • Eosinophil % 03/14/2023 3.6  0.3 - 6.2 % Final   • Basophil % 03/14/2023 0.7  0.0 - 1.5 % Final   • Immature Grans % 03/14/2023 0.2  0.0 - 0.5 % Final   • Neutrophils, Absolute 03/14/2023 2.80  1.70 - 7.00 10*3/mm3 Final   • Lymphocytes, Absolute 03/14/2023 2.33  0.70 - 3.10 10*3/mm3 Final   • Monocytes, Absolute 03/14/2023 0.70  0.10 - 0.90 10*3/mm3 Final   • Eosinophils, Absolute 03/14/2023 0.22  0.00 - 0.40 10*3/mm3 Final   • Basophils, Absolute 03/14/2023 0.04  0.00 - 0.20 10*3/mm3 Final   • Immature Grans, Absolute 03/14/2023 0.01  0.00 - 0.05 10*3/mm3 Final   • nRBC 03/14/2023 0.0  0.0 - 0.2 /100 WBC Final     ASSESSMENT:   is a pleasant 28-year-old -American female with medical history significant for iron deficiency anemia and postpartum hypothyroidism who comes for iron deficiency anemia evaluation and management.    #Microcytic anemia:    · Hemoglobin 7.9, hematocrit 27 & MCV 68.  Additional work-up showed severe deficiency with iron level of 17 & ferritin 7 ng/ml.  Normal vitamin B12 and folate.  · Peripheral smear review performed by me in clinic shows microcytic RBCs, no increased schistocytes, no blasts or increased immature cells, adequate platelets.     · Likely related to heavy menstrual bleed and subsequent deficiency.  Patient denies any blood loss in urine or stool.  Reports of eating adequate diet.  No family history of sickle cell disease or any other hemoglobinopathies.  · Given severe iron deficiency anemia, patient received IV iron 300 mg x 4 doses in September - October 2022.   · 12/7/2022: Hemoglobin improved to 10.8 g/dL. Iron profile improved as well. Patient continues to have heavy menstrual bleed.  Patient was advised to continue taking oral iron supplementation and maintain adequate diet rich in iron.   · March 2023: Hb 11.3. iron profile improved as well. Taking over-the-counter Heme Plex/liquid iron replacement.  Tolerating it well.  Also trying to eat more iron in her diet.  Menstrual cycles not as heavy.  Follows up with gynecology.  Follow-up in 3-4 months with labs    #Thrombocytosis: Likely ROSA related.  Resolved with iron replacement    #Heavy menstrual bleed: Was seen by OB/GYN in December 2022.  Patient is being evaluated for hormone treatment.  Advised close follow-up with OB/GYN. Says not heavy anymore.    PLAN:   -Iron deficiency anemia related to heavy menstrual bleed.  received IV Venofer 300 mg x 4 doses in September - October 2022.  -Advised to continue oral iron supplementation daily.  Iron rich food.  -Advised close follow-up with OB/GYN for heavy menstrual bleed management.  - F/u in 3-4 months with repeat labs or sooner if needed    Orders Placed This Encounter   Procedures   • Iron Profile     Standing Status:   Future     Number of Occurrences:   1     Standing Expiration Date:   3/9/2024   • Ferritin      Standing Status:   Future     Number of Occurrences:   1     Standing Expiration Date:   3/9/2024   • CBC & Differential     Standing Status:   Future     Number of Occurrences:   1     Standing Expiration Date:   3/9/2024     Order Specific Question:   Manual Differential     Answer:   No

## 2023-05-19 RX ORDER — SCOLOPAMINE TRANSDERMAL SYSTEM 1 MG/1
1 PATCH, EXTENDED RELEASE TRANSDERMAL
Qty: 5 PATCH | Refills: 0 | Status: SHIPPED | OUTPATIENT
Start: 2023-05-19

## 2023-07-24 ENCOUNTER — OFFICE VISIT (OUTPATIENT)
Dept: INTERNAL MEDICINE | Facility: CLINIC | Age: 28
End: 2023-07-24
Payer: COMMERCIAL

## 2023-07-24 VITALS — SYSTOLIC BLOOD PRESSURE: 139 MMHG | DIASTOLIC BLOOD PRESSURE: 74 MMHG | OXYGEN SATURATION: 99 % | HEART RATE: 82 BPM

## 2023-07-24 DIAGNOSIS — R31.9 URINARY TRACT INFECTION WITH HEMATURIA, SITE UNSPECIFIED: ICD-10-CM

## 2023-07-24 DIAGNOSIS — R30.0 DYSURIA: ICD-10-CM

## 2023-07-24 DIAGNOSIS — R30.0 DYSURIA: Primary | ICD-10-CM

## 2023-07-24 DIAGNOSIS — N39.0 URINARY TRACT INFECTION WITH HEMATURIA, SITE UNSPECIFIED: ICD-10-CM

## 2023-07-24 LAB
BACTERIA UR QL AUTO: ABNORMAL /HPF
BILIRUB UR QL STRIP: NEGATIVE
CLARITY UR: ABNORMAL
COLOR UR: YELLOW
GLUCOSE UR STRIP-MCNC: NEGATIVE MG/DL
HGB UR QL STRIP.AUTO: ABNORMAL
HYALINE CASTS UR QL AUTO: ABNORMAL /LPF
KETONES UR QL STRIP: NEGATIVE
LEUKOCYTE ESTERASE UR QL STRIP.AUTO: NEGATIVE
MUCOUS THREADS URNS QL MICRO: ABNORMAL /HPF
NITRITE UR QL STRIP: NEGATIVE
PH UR STRIP.AUTO: 6 [PH] (ref 5–8)
PROT UR QL STRIP: ABNORMAL
RBC # UR STRIP: ABNORMAL /HPF
REF LAB TEST METHOD: ABNORMAL
SP GR UR STRIP: >=1.03 (ref 1–1.03)
SQUAMOUS #/AREA URNS HPF: ABNORMAL /HPF
UROBILINOGEN UR QL STRIP: ABNORMAL
WBC # UR STRIP: ABNORMAL /HPF

## 2023-07-24 PROCEDURE — 99213 OFFICE O/P EST LOW 20 MIN: CPT

## 2023-07-24 RX ORDER — NITROFURANTOIN 25; 75 MG/1; MG/1
100 CAPSULE ORAL 2 TIMES DAILY
Qty: 10 CAPSULE | Refills: 0 | Status: SHIPPED | OUTPATIENT
Start: 2023-07-24 | End: 2023-07-29

## 2023-07-24 NOTE — PROGRESS NOTES
Chief Complaint  Urinary Tract Infection     Subjective:      History of Present Illness {CC  Problem List  Visit  Diagnosis   Encounters  Notes  Medications  Labs  Result Review Imaging  Media :23}     Earl Maciel presents to [facility] for:      History of Present Illness  Last UTI was 2 years ago    Urinary Tract Infection   This is a new problem. The current episode started yesterday. There has been no fever. Associated symptoms include frequency and hesitancy. Pertinent negatives include no chills, flank pain, hematuria or urgency. She has tried nothing for the symptoms.        I have reviewed patient's medical history, any new submitted information provided by patient or medical assistant and updated medical record.      Objective:      Physical Exam  Vitals reviewed.   Constitutional:       Appearance: Normal appearance.   HENT:      Head: Normocephalic.   Cardiovascular:      Rate and Rhythm: Normal rate and regular rhythm.      Pulses: Normal pulses.      Heart sounds: Normal heart sounds.   Pulmonary:      Effort: Pulmonary effort is normal.      Breath sounds: Normal breath sounds.   Abdominal:      General: Abdomen is flat. Bowel sounds are normal.      Palpations: Abdomen is soft.      Tenderness: There is no abdominal tenderness. There is no right CVA tenderness or left CVA tenderness.   Skin:     General: Skin is warm and dry.      Capillary Refill: Capillary refill takes less than 2 seconds.   Neurological:      General: No focal deficit present.      Mental Status: She is alert.   Psychiatric:         Mood and Affect: Mood normal.         Behavior: Behavior normal.      Result Review  Data Reviewed:{ Labs  Result Review  Imaging  Med Tab  Media :23}     The following data was reviewed by: SIA Jackson on 07/24/2023    URINE DIPSTICK:  Lab Results - Last 18 Months   Lab Units 07/24/23  0926   COLOR UA  Yellow   CLARITY UA   Slightly Cloudy*   PH, URINE  6.0   LEUKOCYTES UA  Negative   UROBILINOGEN UA  0.2 E.U./dL   BILIRUBIN UA  Negative          Vital Signs:   /74 (BP Location: Left arm, Patient Position: Sitting, Cuff Size: Large Adult)   Pulse 82   SpO2 99%         Requested Prescriptions     Signed Prescriptions Disp Refills    nitrofurantoin, macrocrystal-monohydrate, (Macrobid) 100 MG capsule 10 capsule 0     Sig: Take 1 capsule by mouth 2 (Two) Times a Day for 5 days.       Routine medications provided by this office will also be refilled via pharmacy request.       Current Outpatient Medications:     naloxone (NARCAN) 4 MG/0.1ML nasal spray, 1 spray into the nostril(s) as directed by provider As Needed (overdose/sedation)., Disp: 1 each, Rfl: 1    buPROPion XL (Wellbutrin XL) 150 MG 24 hr tablet, Take 1 tablet by mouth Daily. (Patient not taking: Reported on 7/24/2023), Disp: 90 tablet, Rfl: 1    nitrofurantoin, macrocrystal-monohydrate, (Macrobid) 100 MG capsule, Take 1 capsule by mouth 2 (Two) Times a Day for 5 days., Disp: 10 capsule, Rfl: 0    Scopolamine 1 MG/3DAYS patch, Place 1 patch on the skin as directed by provider Every 72 (Seventy-Two) Hours. (Patient not taking: Reported on 7/24/2023), Disp: 5 patch, Rfl: 0     Assessment and Plan:      Assessment and Plan {CC Problem List  Visit Diagnosis  ROS  Review (Popup)  Health Maintenance  Quality  BestPractice  Medications  SmartSets  SnapShot Encounters  Media :23}     Problem List Items Addressed This Visit    None  Visit Diagnoses       Dysuria    -  Primary    Relevant Orders    Urinalysis With Culture If Indicated - Urine, Clean Catch (Completed)    Urinalysis, Microscopic Only - Urine, Clean Catch (Completed)    Urinary tract infection with hematuria, site unspecified        Relevant Medications    nitrofurantoin, macrocrystal-monohydrate, (Macrobid) 100 MG capsule          Educated patient on side effects and dosing of Macrobid.  We will send  urine off for culture and review with patient once available.  We will change antibiotic if needed.  Educated patient to drink extra fluids.  Patient verbalized understanding and is comfortable with the plan of care.    Follow Up {Instructions Charge Capture  Follow-up Communications :23}     No follow-ups on file.      Patient was given instructions and counseling regarding her condition or for health maintenance advice. Please see specific information pulled into the AVS if appropriate.    Dragon disclaimer:   Much of this encounter note is an electronic transcription/translation of spoken language to printed text. The electronic translation of spoken language may permit erroneous, or at times, nonsensical words or phrases to be inadvertently transcribed; Although I have reviewed the note for such errors, some may still exist.     Additional Patient Counseling:       There are no Patient Instructions on file for this visit.

## 2023-07-25 LAB
BACTERIA UR CULT: NORMAL
BACTERIA UR CULT: NORMAL

## 2023-09-05 ENCOUNTER — OFFICE VISIT (OUTPATIENT)
Dept: INTERNAL MEDICINE | Facility: CLINIC | Age: 28
End: 2023-09-05
Payer: COMMERCIAL

## 2023-09-05 VITALS — SYSTOLIC BLOOD PRESSURE: 110 MMHG | TEMPERATURE: 97.6 F | HEART RATE: 82 BPM | DIASTOLIC BLOOD PRESSURE: 70 MMHG

## 2023-09-05 DIAGNOSIS — J06.9 VIRAL UPPER RESPIRATORY TRACT INFECTION: Primary | ICD-10-CM

## 2023-09-05 LAB
EXPIRATION DATE: NORMAL
FLUAV RNA RESP QL NAA+PROBE: NOT DETECTED
FLUBV RNA RESP QL NAA+PROBE: NOT DETECTED
INTERNAL CONTROL: NORMAL
Lab: NORMAL
SARS-COV-2 RNA RESP QL NAA+PROBE: NOT DETECTED

## 2023-09-05 RX ORDER — METHYLPREDNISOLONE 4 MG/1
TABLET ORAL
Qty: 21 EACH | Refills: 0 | Status: SHIPPED | OUTPATIENT
Start: 2023-09-05

## 2023-09-05 NOTE — PROGRESS NOTES
"Chief Complaint  Congestion    Subjective        Earl Maciel presents to [facility]  History of Present Illness  This is a 27 y/o female presenting to office for complaints of nasal congestion, eye watering, itchy throat. Patient reports her symptoms started 3 days ago. Denies any fever. Denies any n/v/d. Reports taking benadryl PRN-- reports this has not helped much. Denies any malaise or fatigue.     Objective   Vital Signs:  /70 (BP Location: Left arm, Patient Position: Sitting, Cuff Size: Adult)   Pulse 82   Temp 97.6 °F (36.4 °C) (Oral)   Estimated body mass index is 41.01 kg/m² as calculated from the following:    Height as of 3/14/23: 152.4 cm (60\").    Weight as of 3/14/23: 95.3 kg (210 lb).             Physical Exam  Constitutional:       Appearance: Normal appearance.   HENT:      Head: Normocephalic and atraumatic.      Right Ear: External ear normal.      Left Ear: External ear normal.      Nose: Congestion and rhinorrhea present.      Mouth/Throat:      Mouth: Mucous membranes are moist.      Pharynx: Oropharynx is clear. No posterior oropharyngeal erythema.   Eyes:      Conjunctiva/sclera: Conjunctivae normal.      Pupils: Pupils are equal, round, and reactive to light.   Cardiovascular:      Rate and Rhythm: Normal rate and regular rhythm.      Pulses: Normal pulses.      Heart sounds: Normal heart sounds. No murmur heard.    No gallop.   Pulmonary:      Effort: Pulmonary effort is normal. No respiratory distress.      Breath sounds: Normal breath sounds. No stridor. No wheezing, rhonchi or rales.   Musculoskeletal:         General: Normal range of motion.      Cervical back: Normal range of motion and neck supple.   Skin:     General: Skin is warm and dry.   Neurological:      General: No focal deficit present.      Mental Status: She is alert and oriented to person, place, and time. Mental status is at baseline.   Psychiatric:         Mood and Affect: Mood " normal.         Thought Content: Thought content normal.         Judgment: Judgment normal.      Result Review :  The following data was reviewed by: SIA Mendenhall on 09/05/2023:  Common labs          12/7/2022    16:14 3/14/2023    16:28   Common Labs   WBC 4.78  6.10    Hemoglobin 10.8  11.3    Hematocrit 34.6  35.6    Platelets 329  363      Covid-19 + Flu A&B AG, Veritor (09/05/2023 8:55 AM)            Assessment and Plan   Diagnoses and all orders for this visit:    1. Viral upper respiratory tract infection (Primary)  Assessment & Plan:  Medrol pack as ordered.   Mucinex BID PRN  Antihistamine PRN  Continue with fluids      Orders:  -     Covid-19 + Flu A&B AG, Veritor  -     methylPREDNISolone (MEDROL) 4 MG dose pack; Take as directed on package instructions.  Dispense: 21 each; Refill: 0             Follow Up   No follow-ups on file.  Patient was given instructions and counseling regarding her condition or for health maintenance advice. Please see specific information pulled into the AVS if appropriate.

## 2023-10-04 DIAGNOSIS — J01.10 ACUTE NON-RECURRENT FRONTAL SINUSITIS: ICD-10-CM

## 2023-10-04 DIAGNOSIS — J06.9 VIRAL UPPER RESPIRATORY TRACT INFECTION: ICD-10-CM

## 2023-10-04 DIAGNOSIS — R05.1 ACUTE COUGH: Primary | ICD-10-CM

## 2023-10-04 RX ORDER — AZITHROMYCIN 250 MG/1
TABLET, FILM COATED ORAL
Qty: 6 TABLET | Refills: 0 | Status: SHIPPED | OUTPATIENT
Start: 2023-10-04

## 2023-10-04 RX ORDER — METHYLPREDNISOLONE 4 MG/1
TABLET ORAL
Qty: 21 EACH | Refills: 0 | Status: SHIPPED | OUTPATIENT
Start: 2023-10-04

## 2023-11-03 ENCOUNTER — FLU SHOT (OUTPATIENT)
Dept: INTERNAL MEDICINE | Facility: CLINIC | Age: 28
End: 2023-11-03
Payer: COMMERCIAL

## 2023-11-03 DIAGNOSIS — Z23 NEED FOR INFLUENZA VACCINATION: Primary | ICD-10-CM

## 2023-11-03 PROCEDURE — 90471 IMMUNIZATION ADMIN: CPT | Performed by: NURSE PRACTITIONER

## 2023-11-03 PROCEDURE — 90686 IIV4 VACC NO PRSV 0.5 ML IM: CPT | Performed by: NURSE PRACTITIONER

## 2024-02-29 ENCOUNTER — OFFICE VISIT (OUTPATIENT)
Dept: ONCOLOGY | Facility: CLINIC | Age: 29
End: 2024-02-29
Payer: COMMERCIAL

## 2024-02-29 ENCOUNTER — LAB (OUTPATIENT)
Dept: LAB | Facility: HOSPITAL | Age: 29
End: 2024-02-29
Payer: COMMERCIAL

## 2024-02-29 VITALS
BODY MASS INDEX: 41.78 KG/M2 | RESPIRATION RATE: 18 BRPM | SYSTOLIC BLOOD PRESSURE: 142 MMHG | TEMPERATURE: 98.1 F | OXYGEN SATURATION: 99 % | DIASTOLIC BLOOD PRESSURE: 86 MMHG | HEART RATE: 86 BPM | WEIGHT: 212.8 LBS | HEIGHT: 60 IN

## 2024-02-29 DIAGNOSIS — D50.0 IRON DEFICIENCY ANEMIA DUE TO CHRONIC BLOOD LOSS: Primary | ICD-10-CM

## 2024-02-29 DIAGNOSIS — D50.0 IRON DEFICIENCY ANEMIA DUE TO CHRONIC BLOOD LOSS: ICD-10-CM

## 2024-02-29 PROBLEM — K90.9 IRON MALABSORPTION: Status: ACTIVE | Noted: 2024-02-29

## 2024-02-29 LAB
BASOPHILS # BLD AUTO: 0.04 10*3/MM3 (ref 0–0.2)
BASOPHILS NFR BLD AUTO: 0.7 % (ref 0–1.5)
DEPRECATED RDW RBC AUTO: 44.1 FL (ref 37–54)
EOSINOPHIL # BLD AUTO: 0.06 10*3/MM3 (ref 0–0.4)
EOSINOPHIL NFR BLD AUTO: 1.1 % (ref 0.3–6.2)
ERYTHROCYTE [DISTWIDTH] IN BLOOD BY AUTOMATED COUNT: 16.7 % (ref 12.3–15.4)
FERRITIN SERPL-MCNC: 19.8 NG/ML (ref 13–150)
HCT VFR BLD AUTO: 31.3 % (ref 34–46.6)
HGB BLD-MCNC: 9.6 G/DL (ref 12–15.9)
IMM GRANULOCYTES # BLD AUTO: 0.05 10*3/MM3 (ref 0–0.05)
IMM GRANULOCYTES NFR BLD AUTO: 0.9 % (ref 0–0.5)
IRON 24H UR-MRATE: 39 MCG/DL (ref 37–145)
IRON SATN MFR SERPL: 10 % (ref 20–50)
LYMPHOCYTES # BLD AUTO: 1.81 10*3/MM3 (ref 0.7–3.1)
LYMPHOCYTES NFR BLD AUTO: 32.7 % (ref 19.6–45.3)
MCH RBC QN AUTO: 22.8 PG (ref 26.6–33)
MCHC RBC AUTO-ENTMCNC: 30.7 G/DL (ref 31.5–35.7)
MCV RBC AUTO: 74.3 FL (ref 79–97)
MONOCYTES # BLD AUTO: 0.7 10*3/MM3 (ref 0.1–0.9)
MONOCYTES NFR BLD AUTO: 12.7 % (ref 5–12)
NEUTROPHILS NFR BLD AUTO: 2.87 10*3/MM3 (ref 1.7–7)
NEUTROPHILS NFR BLD AUTO: 51.9 % (ref 42.7–76)
NRBC BLD AUTO-RTO: 0 /100 WBC (ref 0–0.2)
PLATELET # BLD AUTO: 463 10*3/MM3 (ref 140–450)
PMV BLD AUTO: 10.8 FL (ref 6–12)
RBC # BLD AUTO: 4.21 10*6/MM3 (ref 3.77–5.28)
TIBC SERPL-MCNC: 385 MCG/DL (ref 298–536)
TRANSFERRIN SERPL-MCNC: 275 MG/DL (ref 200–360)
WBC NRBC COR # BLD AUTO: 5.53 10*3/MM3 (ref 3.4–10.8)

## 2024-02-29 PROCEDURE — 84466 ASSAY OF TRANSFERRIN: CPT

## 2024-02-29 PROCEDURE — 83540 ASSAY OF IRON: CPT

## 2024-02-29 PROCEDURE — 36415 COLL VENOUS BLD VENIPUNCTURE: CPT

## 2024-02-29 PROCEDURE — 85025 COMPLETE CBC W/AUTO DIFF WBC: CPT

## 2024-02-29 PROCEDURE — 82728 ASSAY OF FERRITIN: CPT

## 2024-02-29 RX ORDER — IRON POLYSACCHARIDE COMPLEX 150 MG
150 CAPSULE ORAL 2 TIMES DAILY
Qty: 60 CAPSULE | Refills: 5 | Status: SHIPPED | OUTPATIENT
Start: 2024-02-29

## 2024-02-29 NOTE — PROGRESS NOTES
[facility]    CONSULTING IN BLOOD DISORDERS & CANCER      REASON FOR CONSULTATION/CHIEF COMPLAINT:     Evaluation and management for iron deficiency anemia                             REQUESTING PHYSICIAN: No ref. provider found  RECORDS OBTAINED:  Records of the patients history including those from the electronic medical record were reviewed and summarized in detail.    HISTORY OF PRESENT ILLNESS:    The patient is a 29 y.o. year old female is a pleasant 27-year-old -American female with medical history significant for iron deficiency anemia and postpartum hypothyroidism who had presented to her PCP with fatigue and mental fogginess and August 2022.  She was noted to have severe anemia with hemoglobin of 7.9 on the CBC from 8/31/2022.  WBC count of 3.1, hematocrit of 27.3, MCV 68 and platelet count of 455,000.  Additional work-up showed severe iron deficiency with iron level of 17 mcg/dL and ferritin 7 ng/ml.  Vitamin B12 and folate levels were within normal range.   Patient was referred to hematology for further evaluation and management.    Patient states she was first noted to have iron deficiency anemia during her pregnancy in 2015.  She had received iron infusions during that time.  Patient states she has always had heavy menstrual bleed.  Denies taking any oral contraceptive pills.      Denies any blood in stool or urine.  She does report of eating regular diet, however she is trying to not eat any meat products.  Denies any family history of sickle cell disease or any other hemoglobinopathies.  Denies alcohol, tobacco or drug abuse.  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    Given severe iron deficiency, patient received IV Venofer 300 mg x 4 doses in September - October 2022.    12/7/2022: Hemoglobin improved to 10.8.  2/29/2024: Hemoglobin dropped to 9.6.  Iron profile shows persistent iron deficiency with iron level of 39, ferritin 19.8 and iron saturation  10%.    INTERIM HISTORY:  Patient returns to the clinic for a follow-up visit.  Patient reports of feeling intermittent lightheadedness, mainly positional.  No chest pain, palpitations or shortness of breath.  Patient is currently not taking any oral iron supplementation.  Says she tries to eat dark green vegetables.  Does not eat much red meat.  Menstrual cycles not as heavy.  Follows up with gynecology.  Says she is being planned for a copper IUD placement in near future.     PMH/PSH:  -History of iron deficiency  - Postpartum hypothyroidism  -History of syphilis    No past medical history on file.  Past Surgical History:   Procedure Laterality Date     SECTION      WISDOM TOOTH EXTRACTION         MEDICATIONS    Current Outpatient Medications:     azithromycin (Zithromax Z-Nuno) 250 MG tablet, Take 2 tablets by mouth on day 1, then 1 tablet daily on days 2-5, Disp: 6 tablet, Rfl: 0    buPROPion XL (Wellbutrin XL) 150 MG 24 hr tablet, Take 1 tablet by mouth Daily., Disp: 90 tablet, Rfl: 1    methylPREDNISolone (MEDROL) 4 MG dose pack, Take as directed on package instructions., Disp: 21 each, Rfl: 0    naloxone (NARCAN) 4 MG/0.1ML nasal spray, 1 spray into the nostril(s) as directed by provider As Needed (overdose/sedation)., Disp: 1 each, Rfl: 1    Scopolamine 1 MG/3DAYS patch, Place 1 patch on the skin as directed by provider Every 72 (Seventy-Two) Hours., Disp: 5 patch, Rfl: 0    iron polysaccharides (NIFEREX) 150 MG capsule, Take 1 capsule by mouth 2 (Two) Times a Day., Disp: 60 capsule, Rfl: 5    ALLERGIES:   No Known Allergies    SOCIAL HISTORY:       Social History     Socioeconomic History    Marital status: Single   Tobacco Use    Smoking status: Never    Smokeless tobacco: Never   Substance and Sexual Activity    Alcohol use: Yes     Comment: occ    Drug use: Never    Sexual activity: Yes     Partners: Female         FAMILY HISTORY:  Family History   Problem Relation Age of Onset    No Known  "Problems Mother     No Known Problems Father        REVIEW OF SYSTEMS:  As per HPI         Vitals:    02/29/24 0855   BP: 142/86   Pulse: 86   Resp: 18   Temp: 98.1 °F (36.7 °C)   TempSrc: Temporal   SpO2: 99%   Weight: 96.5 kg (212 lb 12.8 oz)   Height: 152.4 cm (60\")   PainSc: 0-No pain         2/29/2024     8:46 AM   Current Status   ECOG score 0      PHYSICAL EXAM:    CONSTITUTIONAL:  Vital signs reviewed.  No distress, looks comfortable.  EYES:  Conjunctiva and lids unremarkable.   EARS,NOSE,MOUTH,THROAT:  Ears and nose appear unremarkable.  Lips, teeth, gums appear unremarkable.  RESPIRATORY:  Normal respiratory effort.  Lungs clear to auscultation bilaterally.  CARDIOVASCULAR:  Normal S1, S2.  No murmurs rubs or gallops.  No significant lower extremity edema  GASTROINTESTINAL: Abdomen appears unremarkable.  Nondistended LYMPHATIC:  No cervical, supraclavicular lymphadenopathy.  NEURO: AAOx3, no focal deficits.  Appears to have equal strength all 4 extremities.  MUSCULOSKELETAL:  Unremarkable digits/nails.  No cyanosis or clubbing.  No apparent joint deformities.  SKIN:  Warm.  No rashes.  PSYCHIATRIC:  Normal judgment and insight.  Normal mood and affect.     RECENT LABS:        Lab on 02/29/2024   Component Date Value Ref Range Status    Iron 02/29/2024 39  37 - 145 mcg/dL Final    Iron Saturation (TSAT) 02/29/2024 10 (L)  20 - 50 % Final    Transferrin 02/29/2024 275  200 - 360 mg/dL Final    TIBC 02/29/2024 385  298 - 536 mcg/dL Final    Ferritin 02/29/2024 19.80  13.00 - 150.00 ng/mL Final    WBC 02/29/2024 5.53  3.40 - 10.80 10*3/mm3 Final    RBC 02/29/2024 4.21  3.77 - 5.28 10*6/mm3 Final    Hemoglobin 02/29/2024 9.6 (L)  12.0 - 15.9 g/dL Final    Hematocrit 02/29/2024 31.3 (L)  34.0 - 46.6 % Final    MCV 02/29/2024 74.3 (L)  79.0 - 97.0 fL Final    MCH 02/29/2024 22.8 (L)  26.6 - 33.0 pg Final    MCHC 02/29/2024 30.7 (L)  31.5 - 35.7 g/dL Final    RDW 02/29/2024 16.7 (H)  12.3 - 15.4 % Final    RDW-SD " 02/29/2024 44.1  37.0 - 54.0 fl Final    MPV 02/29/2024 10.8  6.0 - 12.0 fL Final    Platelets 02/29/2024 463 (H)  140 - 450 10*3/mm3 Final    Neutrophil % 02/29/2024 51.9  42.7 - 76.0 % Final    Lymphocyte % 02/29/2024 32.7  19.6 - 45.3 % Final    Monocyte % 02/29/2024 12.7 (H)  5.0 - 12.0 % Final    Eosinophil % 02/29/2024 1.1  0.3 - 6.2 % Final    Basophil % 02/29/2024 0.7  0.0 - 1.5 % Final    Immature Grans % 02/29/2024 0.9 (H)  0.0 - 0.5 % Final    Neutrophils, Absolute 02/29/2024 2.87  1.70 - 7.00 10*3/mm3 Final    Lymphocytes, Absolute 02/29/2024 1.81  0.70 - 3.10 10*3/mm3 Final    Monocytes, Absolute 02/29/2024 0.70  0.10 - 0.90 10*3/mm3 Final    Eosinophils, Absolute 02/29/2024 0.06  0.00 - 0.40 10*3/mm3 Final    Basophils, Absolute 02/29/2024 0.04  0.00 - 0.20 10*3/mm3 Final    Immature Grans, Absolute 02/29/2024 0.05  0.00 - 0.05 10*3/mm3 Final    nRBC 02/29/2024 0.0  0.0 - 0.2 /100 WBC Final     ASSESSMENT:   is a pleasant 29-year-old -American female with medical history significant for iron deficiency anemia and postpartum hypothyroidism who comes for iron deficiency anemia evaluation and management.    #Microcytic anemia:   Hemoglobin 7.9, hematocrit 27 & MCV 68.  Additional work-up showed severe deficiency with iron level of 17 & ferritin 7 ng/ml.  Normal vitamin B12 and folate.  Peripheral smear review performed by me in clinic shows microcytic RBCs, no increased schistocytes, no blasts or increased immature cells, adequate platelets.     Likely related to heavy menstrual bleed and subsequent deficiency.  Patient denies any blood loss in urine or stool.  Reports of eating adequate diet.  No family history of sickle cell disease or any other hemoglobinopathies.  Given severe iron deficiency anemia, patient received IV iron 300 mg x 4 doses in September - October 2022.   12/7/2022: Hemoglobin improved to 10.8 g/dL. Iron profile improved as well. Patient continues to have heavy  menstrual bleed.  Patient was advised to continue taking oral iron supplementation and maintain adequate diet rich in iron.   March 2023: Hb 11.3. iron profile improved as well. Taking over-the-counter Heme Plex/liquid iron replacement.  Tolerating it well.  Also trying to eat more iron in her diet.  Menstrual cycles not as heavy.  Follows up with gynecology.  Follow-up in 3-4 months with labs  February 2024: Hemoglobin dropped to 9.6.  Iron profile shows persistent iron deficiency with iron level of 39, ferritin 19.8 and iron saturation 10%.  Given her symptoms along with poor tolerance to oral iron therapy, plan made to administer IV Venofer 300 mg weekly x 4 doses.  Will also attempt another formulation of oral iron with iron polysaccharide 130 mg twice daily.  Patient was advised to reach out to her gynecologist regarding heavy menstrual bleed.  Follow-up in 1 year or sooner if needed.  Patient works with [employer] and was advised to call us sooner if symptoms do not improve or worsen.    #Thrombocytosis: Likely ROSA related.  Resolved with iron replacement    #Heavy menstrual bleed: Was seen by OB/GYN in December 2022.  Patient is being evaluated for copper IUD treatment.  Advised close follow-up with OB/GYN.    PLAN:   -Plan to administer IV Venofer 300 mg weekly x 4 doses  -Start oral iron polysaccharide daily  -Continue iron rich food.  -Advised close follow-up with OB/GYN for heavy menstrual bleed management.  - F/u in 12 months with repeat labs or sooner if needed    Orders Placed This Encounter   Procedures    Iron Profile     Standing Status:   Future     Standing Expiration Date:   4/5/2025     Order Specific Question:   Release to patient     Answer:   Routine Release [[id number]]    Ferritin     Standing Status:   Future     Standing Expiration Date:   4/5/2025     Order Specific Question:   Release to patient     Answer:   Routine Release [[id number]]    CBC & Differential     Standing  Status:   Future     Standing Expiration Date:   4/5/2025     Order Specific Question:   Manual Differential     Answer:   No     Order Specific Question:   Release to patient     Answer:   Routine Release [[id number]]   Total time spent during this patient encounter is 45 minutes. The total time spent with the patient includes: preparing to see the patient by reviewing of tests, prior notes or other relevant information, performing appropriate independent examination & evaluation, counseling, ordering of medications, tests or procedures, documenting clinic information in the electronic medical records or other health records, independently interpreting results of tests and communicating the results to the patient/family or caregiver.

## 2024-03-01 ENCOUNTER — OFFICE VISIT (OUTPATIENT)
Dept: INTERNAL MEDICINE | Facility: CLINIC | Age: 29
End: 2024-03-01
Payer: COMMERCIAL

## 2024-03-01 ENCOUNTER — TELEPHONE (OUTPATIENT)
Dept: ONCOLOGY | Facility: CLINIC | Age: 29
End: 2024-03-01
Payer: COMMERCIAL

## 2024-03-01 VITALS
SYSTOLIC BLOOD PRESSURE: 120 MMHG | BODY MASS INDEX: 41.62 KG/M2 | WEIGHT: 212 LBS | DIASTOLIC BLOOD PRESSURE: 82 MMHG | OXYGEN SATURATION: 99 % | HEIGHT: 60 IN | HEART RATE: 83 BPM

## 2024-03-01 DIAGNOSIS — Z00.00 ANNUAL PHYSICAL EXAM: Primary | ICD-10-CM

## 2024-03-01 DIAGNOSIS — E66.09 CLASS 2 OBESITY DUE TO EXCESS CALORIES WITHOUT SERIOUS COMORBIDITY WITH BODY MASS INDEX (BMI) OF 38.0 TO 38.9 IN ADULT: ICD-10-CM

## 2024-03-01 DIAGNOSIS — R06.83 LOUD SNORING: ICD-10-CM

## 2024-03-01 DIAGNOSIS — D50.8 OTHER IRON DEFICIENCY ANEMIA: Chronic | ICD-10-CM

## 2024-03-01 PROBLEM — J06.9 VIRAL UPPER RESPIRATORY TRACT INFECTION: Status: RESOLVED | Noted: 2023-09-05 | Resolved: 2024-03-01

## 2024-03-01 NOTE — PROGRESS NOTES
"Chief Complaint  Annual Exam    Subjective        Earl Maciel presents to [facility]  History of Present Illness  This is a 30 y/o female presenting to office for CPE.     Patient reports she is exercising 45 min daily. Reports following healthy diet choices.     Denies tobacco use. Reports she is not currently drinking alcohol.     Reports following with SIA Horton for gynecology needs. Has appt on the 13th; will be talking about possible birth control to help with menorrhagia.     Hx ROSA-- continues following with CBC; currently planned for 4 weeks of IV iron weekly; started on iron polysaccharide; recommended close f/u with OBGYN due to heavy menses. Hgb 9.6 HCT 31.3 2/29/24     Scheduled for dental appt.     Reports she does have snoring at night. Reports her wife has witnessed this and reports patient snores very loudly. Reports she does wake up feeling tired. Reports she does have some low energy throughout the day. Reports she is not sure if she stops breathing or has apneic spells while sleeping.     Objective   Vital Signs:  /82 (BP Location: Left arm, Patient Position: Sitting, Cuff Size: Adult)   Pulse 83   Ht 152.4 cm (60\")   Wt 96.2 kg (212 lb)   SpO2 99%   BMI 41.40 kg/m²   Estimated body mass index is 41.4 kg/m² as calculated from the following:    Height as of this encounter: 152.4 cm (60\").    Weight as of this encounter: 96.2 kg (212 lb).       Class 3 Severe Obesity (BMI >=40). Obesity-related health conditions include the following: none. Obesity is improving with lifestyle modifications. BMI is is above average; BMI management plan is completed. We discussed portion control and increasing exercise.      Physical Exam  Constitutional:       General: She is awake.      Appearance: Normal appearance. She is obese.   HENT:      Head: Normocephalic and atraumatic.      Right Ear: Hearing, tympanic membrane, ear canal and external ear normal. "      Left Ear: Hearing, tympanic membrane, ear canal and external ear normal.      Nose: Nose normal. No congestion.      Mouth/Throat:      Lips: Pink.      Mouth: Mucous membranes are moist.      Pharynx: Oropharynx is clear. No oropharyngeal exudate.   Eyes:      Extraocular Movements: Extraocular movements intact.      Conjunctiva/sclera: Conjunctivae normal.      Pupils: Pupils are equal, round, and reactive to light.   Neck:      Vascular: No carotid bruit.   Cardiovascular:      Rate and Rhythm: Normal rate and regular rhythm.      Pulses: Normal pulses.      Heart sounds: Normal heart sounds. No murmur heard.     No gallop.   Pulmonary:      Effort: Pulmonary effort is normal. No respiratory distress.      Breath sounds: Normal breath sounds. No stridor. No wheezing, rhonchi or rales.   Abdominal:      General: Abdomen is flat. Bowel sounds are normal. There is no distension.      Palpations: Abdomen is soft.      Tenderness: There is no abdominal tenderness.   Musculoskeletal:         General: No swelling. Normal range of motion.      Cervical back: Normal range of motion and neck supple.   Skin:     General: Skin is warm and dry.      Capillary Refill: Capillary refill takes less than 2 seconds.      Coloration: Skin is not jaundiced.   Neurological:      General: No focal deficit present.      Mental Status: She is alert and oriented to person, place, and time. Mental status is at baseline.      Motor: Motor function is intact. No weakness.      Coordination: Coordination is intact.      Gait: Gait is intact.      Deep Tendon Reflexes: Reflexes are normal and symmetric.   Psychiatric:         Attention and Perception: Attention normal.         Mood and Affect: Mood normal.         Speech: Speech normal.         Behavior: Behavior normal. Behavior is cooperative.         Thought Content: Thought content normal.         Cognition and Memory: Cognition normal.         Judgment: Judgment normal.        Result  Review :    The following data was reviewed by: SIA Mendenhall on 03/01/2024:  Common labs          3/14/2023    16:28 2/29/2024    08:52   Common Labs   WBC 6.10  5.53    Hemoglobin 11.3  9.6    Hematocrit 35.6  31.3    Platelets 363  463      Social History     Substance and Sexual Activity   Alcohol Use Not Currently    Comment: occ     Family History   Problem Relation Age of Onset    Anxiety disorder Mother     Asthma Mother     Thyroid disease Mother     No Known Problems Father     Asthma Maternal Grandmother     Anxiety disorder Sister      Tobacco Use: Low Risk  (3/1/2024)    Patient History     Smoking Tobacco Use: Never     Smokeless Tobacco Use: Never     Passive Exposure: Not on file                  Assessment and Plan     Diagnoses and all orders for this visit:    1. Annual physical exam (Primary)  Assessment & Plan:  Recommended 150-300 min weekly exercise  Continue with healthy diet choices  Labs ordered  Recommended monthly self breast examinations  Pap smear deferred to gynecology  Anticipatory guidance given regarding health prevention/wellness, diet/exercise, tobacco/alcohol/drug education, exercise and wellbeing, covid 19 guidance, vaccination recommendations, and sexual health/STD education.   Recommended bi-yearly dental exams and regular vision examinations.       Orders:  -     Comprehensive metabolic panel  -     TSH Rfx On Abnormal To Free T4  -     Hemoglobin A1c  -     Lipid panel  -     T3, free    2. Other iron deficiency anemia  Assessment & Plan:  Starting polysaccharide   Venofer x 4 weekly  Recommend close f/u with OBGYN      3. Loud snoring  -     Ambulatory Referral to Sleep Medicine    4. Class 2 obesity due to excess calories without serious comorbidity with body mass index (BMI) of 38.0 to 38.9 in adult  Assessment & Plan:  Class 3 Severe Obesity (BMI >=40). Obesity-related health conditions include the following: none. Obesity is improving with lifestyle modifications.  BMI is is above average; BMI management plan is completed. We discussed portion control and increasing exercise.                 Follow Up     Return in about 1 year (around 3/1/2025) for Annual physical.  Patient was given instructions and counseling regarding her condition or for health maintenance advice. Please see specific information pulled into the AVS if appropriate.

## 2024-03-01 NOTE — ASSESSMENT & PLAN NOTE
We discussed first line agents to help with menorrhagia would be birth control-- has interest in mirena IUD; recommended close f/u with OBGYN

## 2024-03-01 NOTE — ASSESSMENT & PLAN NOTE
Recommended 150-300 min weekly exercise  Continue with healthy diet choices  Labs ordered  Recommended monthly self breast examinations  Pap smear deferred to gynecology  Anticipatory guidance given regarding health prevention/wellness, diet/exercise, tobacco/alcohol/drug education, exercise and wellbeing, covid 19 guidance, vaccination recommendations, and sexual health/STD education.   Recommended bi-yearly dental exams and regular vision examinations.

## 2024-03-01 NOTE — ASSESSMENT & PLAN NOTE
Class 3 Severe Obesity (BMI >=40). Obesity-related health conditions include the following: none. Obesity is improving with lifestyle modifications. BMI is is above average; BMI management plan is completed. We discussed portion control and increasing exercise.

## 2024-03-02 LAB
ALBUMIN SERPL-MCNC: 4.4 G/DL (ref 3.5–5.2)
ALBUMIN/GLOB SERPL: 1.6 G/DL
ALP SERPL-CCNC: 73 U/L (ref 39–117)
ALT SERPL-CCNC: 20 U/L (ref 1–33)
AST SERPL-CCNC: 17 U/L (ref 1–32)
BILIRUB SERPL-MCNC: 0.2 MG/DL (ref 0–1.2)
BUN SERPL-MCNC: 9 MG/DL (ref 6–20)
BUN/CREAT SERPL: 12 (ref 7–25)
CALCIUM SERPL-MCNC: 9.1 MG/DL (ref 8.6–10.5)
CHLORIDE SERPL-SCNC: 104 MMOL/L (ref 98–107)
CHOLEST SERPL-MCNC: 180 MG/DL (ref 0–200)
CO2 SERPL-SCNC: 21.9 MMOL/L (ref 22–29)
CREAT SERPL-MCNC: 0.75 MG/DL (ref 0.57–1)
EGFRCR SERPLBLD CKD-EPI 2021: 112.1 ML/MIN/1.73
GLOBULIN SER CALC-MCNC: 2.7 GM/DL
GLUCOSE SERPL-MCNC: 94 MG/DL (ref 65–99)
HBA1C MFR BLD: 6 % (ref 4.8–5.6)
HDLC SERPL-MCNC: 43 MG/DL (ref 40–60)
LDLC SERPL CALC-MCNC: 122 MG/DL (ref 0–100)
POTASSIUM SERPL-SCNC: 4.9 MMOL/L (ref 3.5–5.2)
PROT SERPL-MCNC: 7.1 G/DL (ref 6–8.5)
SODIUM SERPL-SCNC: 142 MMOL/L (ref 136–145)
T3FREE SERPL-MCNC: 2.5 PG/ML (ref 2–4.4)
TRIGL SERPL-MCNC: 81 MG/DL (ref 0–150)
TSH SERPL DL<=0.005 MIU/L-ACNC: 2.19 UIU/ML (ref 0.27–4.2)
VLDLC SERPL CALC-MCNC: 15 MG/DL (ref 5–40)

## 2024-03-07 ENCOUNTER — INFUSION (OUTPATIENT)
Dept: ONCOLOGY | Facility: HOSPITAL | Age: 29
End: 2024-03-07
Payer: COMMERCIAL

## 2024-03-07 VITALS
TEMPERATURE: 98.2 F | BODY MASS INDEX: 41.95 KG/M2 | WEIGHT: 214.8 LBS | DIASTOLIC BLOOD PRESSURE: 95 MMHG | OXYGEN SATURATION: 99 % | SYSTOLIC BLOOD PRESSURE: 138 MMHG | HEART RATE: 106 BPM

## 2024-03-07 DIAGNOSIS — D50.0 IRON DEFICIENCY ANEMIA DUE TO CHRONIC BLOOD LOSS: Primary | ICD-10-CM

## 2024-03-07 DIAGNOSIS — K90.9 IRON MALABSORPTION: ICD-10-CM

## 2024-03-07 PROCEDURE — 96365 THER/PROPH/DIAG IV INF INIT: CPT

## 2024-03-07 PROCEDURE — 25010000002 IRON SUCROSE PER 1 MG: Performed by: INTERNAL MEDICINE

## 2024-03-07 PROCEDURE — 25810000003 SODIUM CHLORIDE 0.9 % SOLUTION: Performed by: INTERNAL MEDICINE

## 2024-03-07 PROCEDURE — 63710000001 DIPHENHYDRAMINE PER 50 MG: Performed by: INTERNAL MEDICINE

## 2024-03-07 RX ORDER — ACETAMINOPHEN 325 MG/1
650 TABLET ORAL ONCE
Status: COMPLETED | OUTPATIENT
Start: 2024-03-07 | End: 2024-03-07

## 2024-03-07 RX ORDER — DIPHENHYDRAMINE HCL 25 MG
25 CAPSULE ORAL ONCE
Status: COMPLETED | OUTPATIENT
Start: 2024-03-07 | End: 2024-03-07

## 2024-03-07 RX ORDER — SODIUM CHLORIDE 9 MG/ML
20 INJECTION, SOLUTION INTRAVENOUS ONCE
Status: COMPLETED | OUTPATIENT
Start: 2024-03-07 | End: 2024-03-07

## 2024-03-07 RX ADMIN — SODIUM CHLORIDE 20 ML/HR: 9 INJECTION, SOLUTION INTRAVENOUS at 14:31

## 2024-03-07 RX ADMIN — SODIUM CHLORIDE 300 MG: 900 INJECTION, SOLUTION INTRAVENOUS at 14:31

## 2024-03-07 RX ADMIN — ACETAMINOPHEN 650 MG: 325 TABLET ORAL at 14:05

## 2024-03-07 RX ADMIN — DIPHENHYDRAMINE HYDROCHLORIDE 25 MG: 25 CAPSULE ORAL at 14:05

## 2024-03-12 ENCOUNTER — OFFICE VISIT (OUTPATIENT)
Dept: OBSTETRICS AND GYNECOLOGY | Age: 29
End: 2024-03-12
Payer: COMMERCIAL

## 2024-03-12 VITALS
SYSTOLIC BLOOD PRESSURE: 118 MMHG | WEIGHT: 212 LBS | BODY MASS INDEX: 41.62 KG/M2 | DIASTOLIC BLOOD PRESSURE: 78 MMHG | HEIGHT: 60 IN

## 2024-03-12 DIAGNOSIS — Z11.3 SCREEN FOR STD (SEXUALLY TRANSMITTED DISEASE): ICD-10-CM

## 2024-03-12 DIAGNOSIS — Z12.4 SCREENING FOR CERVICAL CANCER: ICD-10-CM

## 2024-03-12 DIAGNOSIS — Z01.419 WELL WOMAN EXAM WITH ROUTINE GYNECOLOGICAL EXAM: Primary | ICD-10-CM

## 2024-03-12 DIAGNOSIS — B02.9 HERPES ZOSTER WITHOUT COMPLICATION: ICD-10-CM

## 2024-03-12 DIAGNOSIS — N92.0 MENORRHAGIA WITH REGULAR CYCLE: ICD-10-CM

## 2024-03-12 DIAGNOSIS — D50.0 IRON DEFICIENCY ANEMIA DUE TO CHRONIC BLOOD LOSS: ICD-10-CM

## 2024-03-12 RX ORDER — VALACYCLOVIR HYDROCHLORIDE 1 G/1
1000 TABLET, FILM COATED ORAL 3 TIMES DAILY
Qty: 21 TABLET | Refills: 0 | Status: SHIPPED | OUTPATIENT
Start: 2024-03-12 | End: 2024-03-19

## 2024-03-12 NOTE — PROGRESS NOTES
Subjective     Chief Complaint   Patient presents with    Gynecologic Exam     Annual, last pap 2022 neg  Cc:Bleeding heavy each month       History of Present Illness    Earl Maciel is a 29 y.o.  who presents for annual exam.    Doing well  Here for heavy periods  Due for annual exam   Sees hematology for iron def anemia - gets iron infusions, most recently had one on 3/7  She states she wears pull ups because the bleeding is so heavy, she will change it hourly for the first  days, periods last 7 days, with severe cramping. She will take ibuprofen for the cramping.   She is in a same sex relationship, same partner x 4 years. Ok with STD screening today. She has an 7 y/o son. Does not want any more children. She is interested in a hysterectomy. Does not want hormones.  She states she noticed a sore/lesion yesterday near buttocks.   She states it feels irritated but not really painful    Obstetric History:  OB History          1    Para   1    Term   1            AB        Living   1         SAB        IAB        Ectopic        Molar        Multiple        Live Births   1               Menstrual History:     Patient's last menstrual period was 2024.         Current contraception:  same sex partner  History of abnormal Pap smear: no  Received Gardasil immunization: yes  Perform regular self breast exam: yes - .  Family history of uterine or ovarian cancer: no  Family History of colon cancer: no  Family history of breast cancer: no    Mammogram: not indicated.  Colonoscopy: not indicated.  DEXA: not indicated.    Exercise: moderately active or not active  Calcium/Vitamin D: adequate intake    The following portions of the patient's history were reviewed and updated as appropriate: allergies, current medications, past family history, past medical history, past social history, past surgical history, and problem list.    Review of Systems   Constitutional: Negative.    Respiratory:  Negative.     Cardiovascular: Negative.    Gastrointestinal: Negative.    Genitourinary:  Positive for menstrual problem.   Skin:  Positive for rash.   Psychiatric/Behavioral: Negative.             Objective   Physical Exam  Constitutional:       General: She is awake.      Appearance: Normal appearance. She is well-developed. She is obese.   HENT:      Head: Normocephalic and atraumatic.      Nose: Nose normal.   Neck:      Thyroid: No thyroid mass, thyromegaly or thyroid tenderness.   Cardiovascular:      Rate and Rhythm: Normal rate and regular rhythm.      Pulses: Normal pulses.      Heart sounds: Normal heart sounds.   Pulmonary:      Effort: Pulmonary effort is normal.      Breath sounds: Normal breath sounds.   Chest:   Breasts:     Breasts are symmetrical.      Right: Normal. No swelling, bleeding, inverted nipple, mass, nipple discharge, skin change or tenderness.      Left: Normal. No swelling, bleeding, inverted nipple, mass, nipple discharge, skin change or tenderness.   Abdominal:      General: Abdomen is flat. Bowel sounds are normal.      Palpations: Abdomen is soft.      Tenderness: There is no abdominal tenderness.   Genitourinary:     General: Normal vulva.      Labia:         Right: No rash, tenderness, lesion or injury.         Left: No rash, tenderness, lesion or injury.       Urethra: No prolapse, urethral pain, urethral swelling or urethral lesion.      Vagina: Normal. No signs of injury. No vaginal discharge, erythema, tenderness, bleeding, lesions or prolapsed vaginal walls.      Cervix: Normal. No discharge, friability, lesion, erythema or cervical bleeding.      Uterus: Normal. Not enlarged, not tender and no uterine prolapse.       Adnexa: Right adnexa normal and left adnexa normal.        Right: No mass, tenderness or fullness.          Left: No mass, tenderness or fullness.        Rectum: Normal. No mass.          Comments: Exam limited by body habitus  There is approx 1 inch grouped  "vesicular rash c/w shingles  Musculoskeletal:      Cervical back: Normal range of motion and neck supple.   Lymphadenopathy:      Upper Body:      Right upper body: No supraclavicular adenopathy.      Left upper body: No supraclavicular adenopathy.   Skin:     General: Skin is warm and dry.   Neurological:      General: No focal deficit present.      Mental Status: She is alert and oriented to person, place, and time.   Psychiatric:         Mood and Affect: Mood normal.         Behavior: Behavior normal. Behavior is cooperative.         Thought Content: Thought content normal.         Judgment: Judgment normal.         /78   Ht 152.4 cm (60\")   Wt 96.2 kg (212 lb)   LMP 02/14/2024   BMI 41.40 kg/m²     Assessment & Plan   Diagnoses and all orders for this visit:    1. Well woman exam with routine gynecological exam (Primary)    2. Screening for cervical cancer  -     IGP,CtNgTv,rfx Aptima HPV ASCU    3. Screen for STD (sexually transmitted disease)  -     IGP,CtNgTv,rfx Aptima HPV ASCU    4. Menorrhagia with regular cycle    5. Iron deficiency anemia due to chronic blood loss    6. Herpes zoster without complication  -     valACYclovir (Valtrex) 1000 MG tablet; Take 1 tablet by mouth 3 (Three) Times a Day for 7 days.  Dispense: 21 tablet; Refill: 0        All questions answered.  Breast self exam technique reviewed and patient encouraged to perform self-exam monthly.  Discussed healthy lifestyle modifications.  Recommended 30 minutes of aerobic exercise five times per week.  Discussed calcium needs to prevent osteoporosis.    -Pap and std screening  -Rash c/w shingles - rx for valtrex tid.  -Plan follow up visit with DR Conway with [facility] to discuss menorrhagia and hyst consult               "

## 2024-03-19 LAB
C TRACH RRNA CVX QL NAA+PROBE: NEGATIVE
CONV .: ABNORMAL
CYTOLOGIST CVX/VAG CYTO: ABNORMAL
CYTOLOGY CVX/VAG DOC CYTO: ABNORMAL
CYTOLOGY CVX/VAG DOC THIN PREP: ABNORMAL
DX ICD CODE: ABNORMAL
DX ICD CODE: ABNORMAL
HPV I/H RISK 4 DNA CVX QL PROBE+SIG AMP: POSITIVE
Lab: ABNORMAL
N GONORRHOEA RRNA CVX QL NAA+PROBE: NEGATIVE
OTHER STN SPEC: ABNORMAL
PATHOLOGIST CVX/VAG CYTO: ABNORMAL
RECOM F/U CVX/VAG CYTO: ABNORMAL
STAT OF ADQ CVX/VAG CYTO-IMP: ABNORMAL
T VAGINALIS RRNA SPEC QL NAA+PROBE: NEGATIVE

## 2024-03-21 ENCOUNTER — INFUSION (OUTPATIENT)
Dept: ONCOLOGY | Facility: HOSPITAL | Age: 29
End: 2024-03-21
Payer: COMMERCIAL

## 2024-03-21 VITALS
BODY MASS INDEX: 41.99 KG/M2 | SYSTOLIC BLOOD PRESSURE: 105 MMHG | TEMPERATURE: 97.8 F | OXYGEN SATURATION: 100 % | RESPIRATION RATE: 16 BRPM | DIASTOLIC BLOOD PRESSURE: 72 MMHG | HEART RATE: 97 BPM | WEIGHT: 215 LBS

## 2024-03-21 DIAGNOSIS — D50.0 IRON DEFICIENCY ANEMIA DUE TO CHRONIC BLOOD LOSS: Primary | ICD-10-CM

## 2024-03-21 DIAGNOSIS — K90.9 IRON MALABSORPTION: ICD-10-CM

## 2024-03-21 PROCEDURE — 25010000002 IRON SUCROSE PER 1 MG: Performed by: INTERNAL MEDICINE

## 2024-03-21 PROCEDURE — 63710000001 DIPHENHYDRAMINE PER 50 MG: Performed by: INTERNAL MEDICINE

## 2024-03-21 PROCEDURE — 96365 THER/PROPH/DIAG IV INF INIT: CPT

## 2024-03-21 PROCEDURE — 25810000003 SODIUM CHLORIDE 0.9 % SOLUTION: Performed by: INTERNAL MEDICINE

## 2024-03-21 PROCEDURE — 96366 THER/PROPH/DIAG IV INF ADDON: CPT

## 2024-03-21 RX ORDER — ACETAMINOPHEN 325 MG/1
650 TABLET ORAL ONCE
Status: COMPLETED | OUTPATIENT
Start: 2024-03-21 | End: 2024-03-21

## 2024-03-21 RX ORDER — DIPHENHYDRAMINE HCL 25 MG
25 CAPSULE ORAL ONCE
Status: COMPLETED | OUTPATIENT
Start: 2024-03-21 | End: 2024-03-21

## 2024-03-21 RX ORDER — SODIUM CHLORIDE 9 MG/ML
20 INJECTION, SOLUTION INTRAVENOUS ONCE
Status: COMPLETED | OUTPATIENT
Start: 2024-03-21 | End: 2024-03-21

## 2024-03-21 RX ADMIN — DIPHENHYDRAMINE HYDROCHLORIDE 25 MG: 25 CAPSULE ORAL at 14:26

## 2024-03-21 RX ADMIN — SODIUM CHLORIDE 300 MG: 900 INJECTION, SOLUTION INTRAVENOUS at 14:49

## 2024-03-21 RX ADMIN — SODIUM CHLORIDE 20 ML/HR: 9 INJECTION, SOLUTION INTRAVENOUS at 14:49

## 2024-03-21 RX ADMIN — ACETAMINOPHEN 650 MG: 325 TABLET ORAL at 14:26

## 2024-05-06 DIAGNOSIS — K21.9 GASTROESOPHAGEAL REFLUX DISEASE, UNSPECIFIED WHETHER ESOPHAGITIS PRESENT: Primary | ICD-10-CM

## 2024-05-06 DIAGNOSIS — N92.0 MENORRHAGIA WITH REGULAR CYCLE: ICD-10-CM

## 2024-05-06 DIAGNOSIS — D50.0 IRON DEFICIENCY ANEMIA DUE TO CHRONIC BLOOD LOSS: ICD-10-CM

## 2024-05-06 RX ORDER — OMEPRAZOLE 20 MG/1
40 CAPSULE, DELAYED RELEASE ORAL DAILY
Qty: 180 CAPSULE | Refills: 3 | Status: SHIPPED | OUTPATIENT
Start: 2024-05-06

## 2024-05-06 RX ORDER — VITAMIN A, ASCORBIC ACID, CHOLECALCIFEROL, .ALPHA.-TOCOPHEROL ACETATE, DL-, THIAMINE MONONITRATE, RIBOFLAVIN, NIACINAMIDE, PYRIDOXINE HYDROCHLORIDE, FOLIC ACID, CYANOCOBALAMIN, CALCIUM CARBONATE, IRON, ZINC OXIDE, AND CUPRIC OXIDE 4000; 120; 400; 22; 1.84; 3; 20; 10; 1; 12; 200; 29; 25; 2 [IU]/1; MG/1; [IU]/1; [IU]/1; MG/1; MG/1; MG/1; MG/1; MG/1; UG/1; MG/1; MG/1; MG/1; MG/1
1 TABLET ORAL DAILY
Qty: 90 TABLET | Refills: 3 | Status: SHIPPED | OUTPATIENT
Start: 2024-05-06

## 2024-05-10 DIAGNOSIS — R10.84 GENERALIZED ABDOMINAL PAIN: Primary | ICD-10-CM

## 2024-05-10 RX ORDER — IBUPROFEN 800 MG/1
800 TABLET ORAL EVERY 6 HOURS PRN
Qty: 30 TABLET | Refills: 1 | Status: SHIPPED | OUTPATIENT
Start: 2024-05-10 | End: 2024-05-10 | Stop reason: SDUPTHER

## 2024-05-10 RX ORDER — IBUPROFEN 800 MG/1
800 TABLET ORAL EVERY 6 HOURS PRN
Qty: 30 TABLET | Refills: 1 | Status: SHIPPED | OUTPATIENT
Start: 2024-05-10

## 2024-05-20 ENCOUNTER — OFFICE VISIT (OUTPATIENT)
Dept: INTERNAL MEDICINE | Facility: CLINIC | Age: 29
End: 2024-05-20
Payer: COMMERCIAL

## 2024-05-20 VITALS
OXYGEN SATURATION: 100 % | WEIGHT: 210 LBS | SYSTOLIC BLOOD PRESSURE: 146 MMHG | HEART RATE: 100 BPM | DIASTOLIC BLOOD PRESSURE: 84 MMHG | BODY MASS INDEX: 41.01 KG/M2

## 2024-05-20 DIAGNOSIS — J02.9 SORE THROAT: Primary | ICD-10-CM

## 2024-05-20 DIAGNOSIS — J02.0 STREP PHARYNGITIS: Primary | ICD-10-CM

## 2024-05-20 LAB
EXPIRATION DATE: ABNORMAL
INTERNAL CONTROL: ABNORMAL
Lab: ABNORMAL
S PYO AG THROAT QL: POSITIVE

## 2024-05-20 PROCEDURE — 87880 STREP A ASSAY W/OPTIC: CPT

## 2024-05-20 PROCEDURE — 99213 OFFICE O/P EST LOW 20 MIN: CPT

## 2024-05-20 RX ORDER — AMOXICILLIN 500 MG/1
1000 CAPSULE ORAL 2 TIMES DAILY
Qty: 40 CAPSULE | Refills: 0 | Status: SHIPPED | OUTPATIENT
Start: 2024-05-20 | End: 2024-05-30

## 2024-05-20 NOTE — PROGRESS NOTES
"        Chief Complaint  Sore Throat     Subjective:      History of Present Illness {CC  Problem List  Visit  Diagnosis   Encounters  Notes  Medications  Labs  Result Review Imaging  Media :23}     Earl Maciel presents to [facility] for:      Sore Throat          Patient states she has had throat swelling, and sore throat for the last 2 days.  Patient has not been around any ill contacts recently.  Patient denies fever, body aches, congestion.    I have reviewed patient's medical history, any new submitted information provided by patient or medical assistant and updated medical record.      Objective:      Physical Exam  Vitals reviewed.   Constitutional:       Appearance: Normal appearance.   HENT:      Head: Normocephalic.      Mouth/Throat:      Pharynx: Oropharyngeal exudate and posterior oropharyngeal erythema present.      Tonsils: Tonsillar exudate present. 2+ on the right. 2+ on the left.   Cardiovascular:      Rate and Rhythm: Normal rate and regular rhythm.   Musculoskeletal:      Cervical back: Tenderness (when palpating neck bialterally) present.   Lymphadenopathy:      Cervical: Cervical adenopathy present.   Skin:     General: Skin is warm and dry.      Capillary Refill: Capillary refill takes less than 2 seconds.   Neurological:      General: No focal deficit present.      Mental Status: She is alert.   Psychiatric:         Mood and Affect: Mood normal.         Behavior: Behavior normal.        Result Review  Data Reviewed:{ Labs  Result Review  Imaging  Med Tab  Media :23}                Vital Signs:   /84 (BP Location: Left arm, Patient Position: Sitting, Cuff Size: Adult)   Pulse 100   Wt 95.3 kg (210 lb)   SpO2 100%   BMI 41.01 kg/m²   Estimated body mass index is 41.01 kg/m² as calculated from the following:    Height as of 3/12/24: 152.4 cm (60\").    Weight as of this encounter: 95.3 kg (210 lb).        Requested Prescriptions      No " prescriptions requested or ordered in this encounter       Routine medications provided by this office will also be refilled via pharmacy request.       Current Outpatient Medications:     amoxicillin (AMOXIL) 500 MG capsule, Take 2 capsules by mouth 2 (Two) Times a Day for 10 days., Disp: 40 capsule, Rfl: 0    ibuprofen (ADVIL,MOTRIN) 800 MG tablet, Take 1 tablet by mouth Every 6 (Six) Hours As Needed for Mild Pain or Moderate Pain. (Patient not taking: Reported on 5/20/2024), Disp: 30 tablet, Rfl: 1    omeprazole (priLOSEC) 20 MG capsule, Take 2 capsules by mouth Daily. (Patient not taking: Reported on 5/20/2024), Disp: 180 capsule, Rfl: 3    Prenatal Vit-Iron Carbonyl-FA (Prenatal Plus Iron) 29-1 MG tablet, Take 1 tablet by mouth Daily. (Patient not taking: Reported on 5/20/2024), Disp: 90 tablet, Rfl: 3     Assessment and Plan:      Assessment and Plan {CC Problem List  Visit Diagnosis  ROS  Review (Popup)  Health Maintenance  Quality  BestPractice  Medications  SmartSets  SnapShot Encounters  Media :23}     Diagnoses and all orders for this visit:    1. Sore throat (Primary)  -     POCT rapid strep A             No orders of the defined types were placed in this encounter.    Educated patient on dosing and side effects of amoxicillin.  See previous encounter for amoxicillin prescription.  Educated patient to use warm salt water, warm liquids.  Patient verbalized understanding and is comfortable plan of care.      Follow Up {Instructions Charge Capture  Follow-up Communications :23}     No follow-ups on file.      Patient was given instructions and counseling regarding her condition or for health maintenance advice. Please see specific information pulled into the AVS if appropriate.    Patrice disclaimer:   Much of this encounter note is an electronic transcription/translation of spoken language to printed text. The electronic translation of spoken language may permit erroneous, or at times,  nonsensical words or phrases to be inadvertently transcribed; Although I have reviewed the note for such errors, some may still exist.     Additional Patient Counseling:       There are no Patient Instructions on file for this visit.

## 2024-06-04 ENCOUNTER — TELEPHONE (OUTPATIENT)
Dept: OBSTETRICS AND GYNECOLOGY | Age: 29
End: 2024-06-04
Payer: COMMERCIAL

## 2024-06-04 NOTE — TELEPHONE ENCOUNTER
Pt has appt Thursday for Colpo. Pt started menses today and will be heavy on Thursday. Should she cancel appt?

## 2024-06-24 ENCOUNTER — OFFICE VISIT (OUTPATIENT)
Dept: INTERNAL MEDICINE | Facility: CLINIC | Age: 29
End: 2024-06-24
Payer: COMMERCIAL

## 2024-06-24 VITALS
TEMPERATURE: 99.1 F | OXYGEN SATURATION: 100 % | HEART RATE: 58 BPM | WEIGHT: 210 LBS | SYSTOLIC BLOOD PRESSURE: 135 MMHG | HEIGHT: 60 IN | DIASTOLIC BLOOD PRESSURE: 91 MMHG | BODY MASS INDEX: 41.23 KG/M2

## 2024-06-24 DIAGNOSIS — D50.8 OTHER IRON DEFICIENCY ANEMIA: Primary | ICD-10-CM

## 2024-06-24 DIAGNOSIS — R07.9 CHEST PAIN, UNSPECIFIED TYPE: ICD-10-CM

## 2024-06-24 DIAGNOSIS — R00.2 PALPITATIONS: ICD-10-CM

## 2024-06-24 PROCEDURE — 99213 OFFICE O/P EST LOW 20 MIN: CPT

## 2024-06-24 RX ORDER — IRON POLYSACCHARIDE COMPLEX 150 MG
150 CAPSULE ORAL 2 TIMES DAILY
Qty: 60 CAPSULE | Refills: 2 | Status: SHIPPED | OUTPATIENT
Start: 2024-06-24

## 2024-06-24 NOTE — PROGRESS NOTES
Chief Complaint  Chest Pain, Back Pain, and Hypertension     Subjective:      History of Present Illness {CC  Problem List  Visit  Diagnosis   Encounters  Notes  Medications  Labs  Result Review Imaging  Media :23}     Earl Maciel presents to [facility] for:      History of Present Illness     Patient complains of chest pain, back pain.  Patient denies shortness of breath during these episodes.  Patient states she does frequently have these and they typically last around 10 minutes.  Today she had 1 that lasted longer.  Patient states her heart feels like it gets slower.  Patient did take an antacid 1 hour prior to this episode today thinking that this was potentially brought on by reflux.  At this time patient had not had anything to eat except for water and coffee.  Patient denies a history of high blood pressure.  Patient states her blood pressures are labile.  Patient took her blood pressure this morning and noted it to be 126/82.  Patient does not take her blood pressures daily.  Patient has no history of cardiac disorders.  Patient does have a significant history of anemia, and does not receive iron transfusions on a regular basis.  Patient states she receives iron transfusions as she was very nauseous on oral iron.  Patient states her last hematologist did recommend a different iron supplement, but she never picked this up.  Patient would like to try this now.      I have reviewed patient's medical history, any new submitted information provided by patient or medical assistant and updated medical record.      Objective:      Physical Exam  Vitals reviewed.   Constitutional:       Appearance: Normal appearance.   HENT:      Head: Normocephalic.   Cardiovascular:      Rate and Rhythm: Normal rate and regular rhythm.      Pulses: Normal pulses.      Heart sounds: Normal heart sounds.   Pulmonary:      Effort: Pulmonary effort is normal.      Breath sounds:  "Normal breath sounds.   Musculoskeletal:      Right lower leg: No edema.      Left lower leg: No edema.   Skin:     General: Skin is warm and dry.      Capillary Refill: Capillary refill takes less than 2 seconds.   Neurological:      General: No focal deficit present.      Mental Status: She is alert.   Psychiatric:         Mood and Affect: Mood normal.         Behavior: Behavior normal.        Result Review  Data Reviewed:{ Labs  Result Review  Imaging  Med Tab  Media :23}                Vital Signs:   /91 (BP Location: Left arm, Patient Position: Sitting, Cuff Size: Large Adult)   Pulse 58   Temp 99.1 °F (37.3 °C) (Oral)   Ht 152.4 cm (60\")   Wt 95.3 kg (210 lb)   SpO2 100%   BMI 41.01 kg/m²   Estimated body mass index is 41.01 kg/m² as calculated from the following:    Height as of this encounter: 152.4 cm (60\").    Weight as of this encounter: 95.3 kg (210 lb).    ECG 12 Lead    Date/Time: 7/9/2024 10:13 AM  Performed by: Gisela Corbett APRN    Authorized by: Gisela Corbett APRN  Comparison: not compared with previous ECG   Rhythm: sinus bradycardia and sinus arrhythmia  Rate: bradycardic  QRS axis: normal  Other findings: T wave abnormality    Clinical impression: non-specific ECG           Requested Prescriptions     Signed Prescriptions Disp Refills    iron polysaccharides (NIFEREX) 150 MG capsule 60 capsule 2     Sig: Take 1 capsule by mouth 2 (Two) Times a Day.       Routine medications provided by this office will also be refilled via pharmacy request.       Current Outpatient Medications:     ibuprofen (ADVIL,MOTRIN) 800 MG tablet, Take 1 tablet by mouth Every 6 (Six) Hours As Needed for Mild Pain or Moderate Pain., Disp: 30 tablet, Rfl: 1    omeprazole (priLOSEC) 20 MG capsule, Take 2 capsules by mouth Daily., Disp: 180 capsule, Rfl: 3    iron polysaccharides (NIFEREX) 150 MG capsule, Take 1 capsule by mouth 2 (Two) Times a Day., Disp: 60 capsule, Rfl: 2    Prenatal " Vit-Iron Carbonyl-FA (Prenatal Plus Iron) 29-1 MG tablet, Take 1 tablet by mouth Daily. (Patient not taking: Reported on 5/20/2024), Disp: 90 tablet, Rfl: 3     Assessment and Plan:      Assessment and Plan {CC Problem List  Visit Diagnosis  ROS  Review (Popup)  Health Maintenance  Quality  BestPractice  Medications  SmartSets  SnapShot Encounters  Media :23}     Diagnoses and all orders for this visit:    1. Other iron deficiency anemia (Primary)  -     CBC w AUTO Differential  -     iron polysaccharides (NIFEREX) 150 MG capsule; Take 1 capsule by mouth 2 (Two) Times a Day.  Dispense: 60 capsule; Refill: 2    2. Chest pain, unspecified type  -     Holter Monitor - 72 Hour Up To 15 Days; Future  -     Adult Transthoracic Echo Complete W/ Cont if Necessary Per Protocol; Future  -     Comprehensive Metabolic Panel  -     ECG 12 Lead    3. Palpitations  -     Holter Monitor - 72 Hour Up To 15 Days; Future  -     Adult Transthoracic Echo Complete W/ Cont if Necessary Per Protocol; Future  -     ECG 12 Lead             New Medications Ordered This Visit   Medications    iron polysaccharides (NIFEREX) 150 MG capsule     Sig: Take 1 capsule by mouth 2 (Two) Times a Day.     Dispense:  60 capsule     Refill:  2       Educated patient on dosing and side effects of Niferex.  Will get labs today and review with patient once available.  Will get echo and get patient set up for Holter monitor and review results when available.  Patient verbalizes understanding and is comfortable with the plan of care.    Follow Up {Instructions Charge Capture  Follow-up Communications :23}     No follow-ups on file.      Patient was given instructions and counseling regarding her condition or for health maintenance advice. Please see specific information pulled into the AVS if appropriate.    Dragon disclaimer:   Much of this encounter note is an electronic transcription/translation of spoken language to printed text. The  electronic translation of spoken language may permit erroneous, or at times, nonsensical words or phrases to be inadvertently transcribed; Although I have reviewed the note for such errors, some may still exist.     Additional Patient Counseling:       There are no Patient Instructions on file for this visit.

## 2024-06-25 LAB
ALBUMIN SERPL-MCNC: 4.4 G/DL (ref 3.5–5.2)
ALBUMIN/GLOB SERPL: 1.4 G/DL
ALP SERPL-CCNC: 69 U/L (ref 39–117)
ALT SERPL-CCNC: 15 U/L (ref 1–33)
AST SERPL-CCNC: 14 U/L (ref 1–32)
BASOPHILS # BLD AUTO: 0.05 10*3/MM3 (ref 0–0.2)
BASOPHILS NFR BLD AUTO: 0.8 % (ref 0–1.5)
BILIRUB SERPL-MCNC: 0.3 MG/DL (ref 0–1.2)
BUN SERPL-MCNC: 9 MG/DL (ref 6–20)
BUN/CREAT SERPL: 11.1 (ref 7–25)
CALCIUM SERPL-MCNC: 9.7 MG/DL (ref 8.6–10.5)
CHLORIDE SERPL-SCNC: 100 MMOL/L (ref 98–107)
CO2 SERPL-SCNC: 22.2 MMOL/L (ref 22–29)
CREAT SERPL-MCNC: 0.81 MG/DL (ref 0.57–1)
EGFRCR SERPLBLD CKD-EPI 2021: 102.2 ML/MIN/1.73
EOSINOPHIL # BLD AUTO: 0.12 10*3/MM3 (ref 0–0.4)
EOSINOPHIL NFR BLD AUTO: 2 % (ref 0.3–6.2)
ERYTHROCYTE [DISTWIDTH] IN BLOOD BY AUTOMATED COUNT: 15.7 % (ref 12.3–15.4)
GLOBULIN SER CALC-MCNC: 3.1 GM/DL
GLUCOSE SERPL-MCNC: 105 MG/DL (ref 65–99)
HCT VFR BLD AUTO: 34.1 % (ref 34–46.6)
HGB BLD-MCNC: 10.5 G/DL (ref 12–15.9)
IMM GRANULOCYTES # BLD AUTO: 0.01 10*3/MM3 (ref 0–0.05)
IMM GRANULOCYTES NFR BLD AUTO: 0.2 % (ref 0–0.5)
LYMPHOCYTES # BLD AUTO: 2.51 10*3/MM3 (ref 0.7–3.1)
LYMPHOCYTES NFR BLD AUTO: 41.7 % (ref 19.6–45.3)
MCH RBC QN AUTO: 22.9 PG (ref 26.6–33)
MCHC RBC AUTO-ENTMCNC: 30.8 G/DL (ref 31.5–35.7)
MCV RBC AUTO: 74.5 FL (ref 79–97)
MONOCYTES # BLD AUTO: 0.59 10*3/MM3 (ref 0.1–0.9)
MONOCYTES NFR BLD AUTO: 9.8 % (ref 5–12)
NEUTROPHILS # BLD AUTO: 2.74 10*3/MM3 (ref 1.7–7)
NEUTROPHILS NFR BLD AUTO: 45.5 % (ref 42.7–76)
PLATELET # BLD AUTO: 390 10*3/MM3 (ref 140–450)
POTASSIUM SERPL-SCNC: 4.5 MMOL/L (ref 3.5–5.2)
PROT SERPL-MCNC: 7.5 G/DL (ref 6–8.5)
RBC # BLD AUTO: 4.58 10*6/MM3 (ref 3.77–5.28)
SODIUM SERPL-SCNC: 136 MMOL/L (ref 136–145)
WBC # BLD AUTO: 6.02 10*3/MM3 (ref 3.4–10.8)

## 2024-06-27 ENCOUNTER — OFFICE VISIT (OUTPATIENT)
Dept: OBSTETRICS AND GYNECOLOGY | Age: 29
End: 2024-06-27
Payer: COMMERCIAL

## 2024-06-27 VITALS
SYSTOLIC BLOOD PRESSURE: 118 MMHG | HEIGHT: 60 IN | DIASTOLIC BLOOD PRESSURE: 68 MMHG | BODY MASS INDEX: 40.95 KG/M2 | WEIGHT: 208.6 LBS

## 2024-06-27 DIAGNOSIS — R87.610 ATYPICAL SQUAMOUS CELLS OF UNDETERMINED SIGNIFICANCE ON CYTOLOGIC SMEAR OF CERVIX (ASC-US): Primary | ICD-10-CM

## 2024-06-27 DIAGNOSIS — N94.6 DYSMENORRHEA: ICD-10-CM

## 2024-06-27 DIAGNOSIS — Z01.818 PREPROCEDURAL EXAMINATION: ICD-10-CM

## 2024-06-27 DIAGNOSIS — N93.9 ABNORMAL UTERINE BLEEDING (AUB): ICD-10-CM

## 2024-06-27 LAB
B-HCG UR QL: NEGATIVE
EXPIRATION DATE: NORMAL
INTERNAL NEGATIVE CONTROL: NORMAL
INTERNAL POSITIVE CONTROL: NORMAL
Lab: NORMAL

## 2024-06-27 NOTE — PROGRESS NOTES
[facility]   Obstetrics and Gynecology   New Gynecology Visit    2024    Patient: Earl Maciel          MR#:[id number]    History of Present Illness    Chief Complaint   Patient presents with    Follow-up     Colposcopy today       29 y.o. female  who presents for colposcopy.  She recently had a ASCUS and HPV positive Pap.  Reports abnormal Pap in the past that did not require colpo.  Denies tobacco use.    Patient also reports very heavy menses.  She has a regular monthly menses that used to last 3 to 4 days in the last 6-7.  She also has severe right-sided cramping with menses.  She passes very large clots and can feel them passing.  She had a Nexplanon after her last pregnancy that was removed at 3 years.  She felt like menses reduce some but not significantly.  She has had 8 iron transfusions in the past for persistent anemia due to heavy menses.  She is interested in definitive management with hysterectomy.  She has a female partner so does not need pregnancy prevention and is not interested in trying any other contraception.    Studies reviewed:  HPV DNA Probe, Direct - , (2024 13:14)  IGP,CtNgTv,rfx Aptima HPV ASCU (2024 13:14)    Obstetric History:  OB History          1    Para   1    Term   1            AB        Living   1         SAB        IAB        Ectopic        Molar        Multiple        Live Births   1               Menstrual History:     Patient's last menstrual period was 2024 (exact date).       Sexual History:   Sexually active with female partner      Social History:  Works as an MA in [employer]  Two kids - one birthed by her, one by her partner  ________________________________________  Patient Active Problem List   Diagnosis    Annual physical exam    ROSA (iron deficiency anemia)    Iron deficiency anemia due to chronic blood loss    Adverse effect of iron    Class 2 obesity due to excess  calories without serious comorbidity with body mass index (BMI) of 38.0 to 38.9 in adult    Menorrhagia with regular cycle    Mass overlapping multiple quadrants of left breast    Iron malabsorption     Past Medical History:   Diagnosis Date    Abnormal Pap smear of cervix     Anemia     HPV (human papilloma virus) infection     Urinary tract infection      Past Surgical History:   Procedure Laterality Date     SECTION      WISDOM TOOTH EXTRACTION       Social History     Tobacco Use   Smoking Status Never    Passive exposure: Never   Smokeless Tobacco Never     Family History   Problem Relation Age of Onset    No Known Problems Father     Anxiety disorder Mother     Asthma Mother     Thyroid disease Mother     Anxiety disorder Sister     Asthma Maternal Grandmother     Breast cancer Neg Hx     Ovarian cancer Neg Hx     Uterine cancer Neg Hx     Colon cancer Neg Hx      Prior to Admission medications    Medication Sig Start Date End Date Taking? Authorizing Provider   ibuprofen (ADVIL,MOTRIN) 800 MG tablet Take 1 tablet by mouth Every 6 (Six) Hours As Needed for Mild Pain or Moderate Pain. 5/10/24  Yes Gisela Corbett APRN   iron polysaccharides (NIFEREX) 150 MG capsule Take 1 capsule by mouth 2 (Two) Times a Day. 24  Yes Gisela Corbett APRN   omeprazole (priLOSEC) 20 MG capsule Take 2 capsules by mouth Daily. 24  Yes Gisela Corbett APRN   Prenatal Vit-Iron Carbonyl-FA (Prenatal Plus Iron) 29-1 MG tablet Take 1 tablet by mouth Daily.  Patient not taking: Reported on 2024   Gisela Corbett APRN     ________________________________________    The following portions of the patient's history were reviewed and updated as appropriate: allergies, current medications, past family history, past medical history, past social history, past surgical history, and problem list.    Review of Systems   All other systems reviewed and are negative.           Objective  "    /68   Ht 152.4 cm (60\")   Wt 94.6 kg (208 lb 9.6 oz)   LMP 06/04/2024 (Exact Date)   Breastfeeding No   BMI 40.74 kg/m²    BP Readings from Last 3 Encounters:   06/27/24 118/68   06/24/24 135/91   05/20/24 146/84      Wt Readings from Last 3 Encounters:   06/27/24 94.6 kg (208 lb 9.6 oz)   06/24/24 95.3 kg (210 lb)   05/20/24 95.3 kg (210 lb)        BMI: Estimated body mass index is 40.74 kg/m² as calculated from the following:    Height as of this encounter: 152.4 cm (60\").    Weight as of this encounter: 94.6 kg (208 lb 9.6 oz).    Physical Exam  Vitals and nursing note reviewed.   Constitutional:       General: She is not in acute distress.     Appearance: Normal appearance.   Pulmonary:      Effort: Pulmonary effort is normal. No respiratory distress.   Abdominal:      General: There is no distension.      Palpations: Abdomen is soft.      Tenderness: There is no abdominal tenderness.   Genitourinary:     General: Normal vulva.   Neurological:      General: No focal deficit present.      Mental Status: She is alert and oriented to person, place, and time.   Psychiatric:         Mood and Affect: Mood normal.         Behavior: Behavior normal.         Thought Content: Thought content normal.         Judgment: Judgment normal.       Colposcopy Procedure Note    Indications: Recent pap smear showed ASCUS/HPV pos    Procedure Details   The risks and benefits of the procedure were reviewed, and verbal informed consent was obtained.  Speculum was placed in vagina, and excellent visualization of cervix achieved.  Cervix was swabbed x 3 with acetic acid solution and then Lugol's solution.  Colposcopic exam revealed findings noted below. Endocervical curettage and spirabrush were performed. Hemostasis was confirmed.    Findings: complete squamocolumnar junction was identified, cervix showed no acetowhite or nonstaining areas    Specimens: endocervical curettings, Spira brush    Complications: none, patient " tolerated the procedure well    Plan:  Specimens sent to pathology  Will base treatment plan on these results  Patient instructed to take NSAIDs for pain control        Assessment:  Diagnoses and all orders for this visit:    1. Atypical squamous cells of undetermined significance on cytologic smear of cervix (ASC-US) (Primary)  -     Reference Histopathology    2. Abnormal uterine bleeding (AUB)    3. Preprocedural examination  -     POC Pregnancy, Urine    4. Dysmenorrhea    -Colposcopy performed and no lesions identified.  Spira brush and ECC collected.  If normal, discussed repeating Pap in 1 year.  - Patient has a long history of AUB and dysmenorrhea despite medical management.  She is interested in definitive management with hysterectomy.  Discussed scheduling GYN ultrasound and appointment to discuss further.      Plan:  Return for Next f/u with gyn Us.      Malinda Conway MD  6/27/2024 10:32 EDT

## 2024-07-03 LAB
PATH REPORT.FINAL DX SPEC: NORMAL
PATH REPORT.GROSS SPEC: NORMAL
PATH REPORT.SITE OF ORIGIN SPEC: NORMAL
PATHOLOGIST NAME: NORMAL
PAYMENT PROCEDURE: NORMAL

## 2024-07-09 PROCEDURE — 93000 ELECTROCARDIOGRAM COMPLETE: CPT

## 2024-07-25 ENCOUNTER — OFFICE VISIT (OUTPATIENT)
Dept: OBSTETRICS AND GYNECOLOGY | Age: 29
End: 2024-07-25
Payer: COMMERCIAL

## 2024-07-25 VITALS
WEIGHT: 214 LBS | SYSTOLIC BLOOD PRESSURE: 122 MMHG | BODY MASS INDEX: 42.01 KG/M2 | HEIGHT: 60 IN | DIASTOLIC BLOOD PRESSURE: 80 MMHG

## 2024-07-25 DIAGNOSIS — D06.9 HIGH GRADE SQUAMOUS INTRAEPITHELIAL LESION (HGSIL), GRADE 3 CIN, ON BIOPSY OF CERVIX: Primary | ICD-10-CM

## 2024-07-25 DIAGNOSIS — R93.89 THICKENED ENDOMETRIUM: ICD-10-CM

## 2024-07-25 DIAGNOSIS — N93.9 ABNORMAL UTERINE BLEEDING (AUB): ICD-10-CM

## 2024-07-25 PROBLEM — Z00.00 ANNUAL PHYSICAL EXAM: Status: RESOLVED | Noted: 2022-08-30 | Resolved: 2024-07-25

## 2024-07-25 PROBLEM — D50.9 IDA (IRON DEFICIENCY ANEMIA): Status: RESOLVED | Noted: 2022-08-30 | Resolved: 2024-07-25

## 2024-07-25 PROCEDURE — 99214 OFFICE O/P EST MOD 30 MIN: CPT | Performed by: STUDENT IN AN ORGANIZED HEALTH CARE EDUCATION/TRAINING PROGRAM

## 2024-07-25 NOTE — PROGRESS NOTES
[facility]   Obstetrics and Gynecology     2024      Patient:  Earl Maciel   MR#:[id number]    Office note    Chief Complaint   Patient presents with    Follow-up     Dysmenorrhea and AUB follow up U/S today       Subjective     History of Present Illness  29 y.o. female  presents for follow-up of AUB and abnormal Pap.  She is here with here [facility] today.  She recently underwent colposcopy that showed no apparent lesions but ECC and Spira brush showed small fragments suspicious for H NELLY.    Patient also reports very heavy menses. She has a regular monthly menses that used to last 3 to 4 days in the last 6-7. She also has severe right-sided cramping with menses. She passes very large clots and can feel them passing. She had a Nexplanon after her last pregnancy that was removed at 3 years. She felt like menses reduce some but not significantly. She has had 8 iron transfusions in the past for persistent anemia due to heavy menses. She is interested in definitive management with hysterectomy. She has a female partner so does not need pregnancy prevention and is not interested in trying any other contraception.       Relevant data reviewed:  Reference Histopathology (2024 12:30) ECC and spirabrush suspicious for HSIL  IGP,CtNgTv,rfx Aptima HPV ASCU (2024 13:14)   US Non-ob Transvaginal (2024 08:17) uterine vol 204cc, EMS 20mm, 2cm intramural fibroid, normal ovaries    Patient Active Problem List   Diagnosis    Iron deficiency anemia due to chronic blood loss    Adverse effect of iron    Class 2 obesity due to excess calories without serious comorbidity with body mass index (BMI) of 38.0 to 38.9 in adult    Menorrhagia with regular cycle    Mass overlapping multiple quadrants of left breast    Iron malabsorption    CAYDEN III (cervical intraepithelial neoplasia grade III) with severe dysplasia       Past Medical History:   Diagnosis Date    Abnormal Pap smear of cervix      Anemia     HPV (human papilloma virus) infection     Urinary tract infection      Past Surgical History:   Procedure Laterality Date     SECTION      WISDOM TOOTH EXTRACTION       Obstetric History:  OB History          1    Para   1    Term   1            AB        Living   1         SAB        IAB        Ectopic        Molar        Multiple        Live Births   1               Menstrual History:     Patient's last menstrual period was 2024 (exact date).       # 1 - Date: 01/06/15, Sex: Male, Weight: 3232 g (7 lb 2 oz), GA: None, Type: , Low Transverse, Apgar1: None, Apgar5: None, Living: Living, Birth Comments: None    Family History   Problem Relation Age of Onset    No Known Problems Father     Anxiety disorder Mother     Asthma Mother     Thyroid disease Mother     Anxiety disorder Sister     Asthma Maternal Grandmother     Breast cancer Neg Hx     Ovarian cancer Neg Hx     Uterine cancer Neg Hx     Colon cancer Neg Hx      Social History     Tobacco Use    Smoking status: Never     Passive exposure: Never    Smokeless tobacco: Never   Vaping Use    Vaping status: Never Used   Substance Use Topics    Alcohol use: Not Currently     Comment: occ    Drug use: Never     Patient has no known allergies.    Current Outpatient Medications:     ibuprofen (ADVIL,MOTRIN) 800 MG tablet, Take 1 tablet by mouth Every 6 (Six) Hours As Needed for Mild Pain or Moderate Pain., Disp: 30 tablet, Rfl: 1    iron polysaccharides (NIFEREX) 150 MG capsule, Take 1 capsule by mouth 2 (Two) Times a Day., Disp: 60 capsule, Rfl: 2    omeprazole (priLOSEC) 20 MG capsule, Take 2 capsules by mouth Daily., Disp: 180 capsule, Rfl: 3    Prenatal Vit-Iron Carbonyl-FA (Prenatal Plus Iron) 29-1 MG tablet, Take 1 tablet by mouth Daily. (Patient not taking: Reported on 2024), Disp: 90 tablet, Rfl: 3    The following portions of the patient's history were reviewed and updated as appropriate: allergies, current  "medications, past family history, past medical history, past social history, past surgical history, and problem list.    Review of Systems   All other systems reviewed and are negative.      BP Readings from Last 3 Encounters:   07/25/24 122/80   06/27/24 118/68   06/24/24 135/91      Wt Readings from Last 3 Encounters:   07/25/24 97.1 kg (214 lb)   06/27/24 94.6 kg (208 lb 9.6 oz)   06/24/24 95.3 kg (210 lb)      BMI: Estimated body mass index is 41.79 kg/m² as calculated from the following:    Height as of this encounter: 152.4 cm (60\").    Weight as of this encounter: 97.1 kg (214 lb). BSA: Estimated body surface area is 1.92 meters squared as calculated from the following:    Height as of this encounter: 152.4 cm (60\").    Weight as of this encounter: 97.1 kg (214 lb).    Objective   Physical Exam  Vitals and nursing note reviewed.   Constitutional:       General: She is not in acute distress.     Appearance: Normal appearance.   Pulmonary:      Effort: Pulmonary effort is normal. No respiratory distress.   Abdominal:      General: There is no distension.      Palpations: Abdomen is soft.      Tenderness: There is no abdominal tenderness.   Neurological:      General: No focal deficit present.      Mental Status: She is alert and oriented to person, place, and time.   Psychiatric:         Mood and Affect: Mood normal.         Behavior: Behavior normal.         Thought Content: Thought content normal.         Judgment: Judgment normal.         Assessment & Plan     Diagnoses and all orders for this visit:    1. High grade squamous intraepithelial lesion (HGSIL), grade 3 CAYDEN, on biopsy of cervix (Primary)  -     Case Request; Standing  -     Case Request    2. Abnormal uterine bleeding (AUB)  -     Case Request; Standing  -     Case Request    3. Thickened endometrium  -     Case Request; Standing  -     Case Request    Other orders  -     Outpatient In A Bed; Standing  -     Follow Anesthesia Guidelines / " Protocol; Future  -     Follow Anesthesia Guidelines / Protocol; Standing  -     Verify / Perform Chlorhexidine Skin Prep; Standing  -     Provide Patient With Instructions on NPO Status; Future  -     Provide Chlorhexidine Skin Prep Wipes and Instructions; Future  -     Place Sequential Compression Device; Standing  -     Maintain Sequential Compression Device; Standing      -Discussed recommendation for cold knife conization for treatment of H NELLY on cervical biopsy.  Patient has no interest in childbearing and would like to proceed.  - Also discussed evaluation of thickened endometrium and AUB with hysteroscopy dilation curettage at same time.  Patient is amenable.  - Discussed risk of procedure including pain, bleeding, infection, damage to surrounding structures.  - Patient is ultimately interested in hysterectomy for definitive management of AUB and fibroids.  We discussed medical management options but she declines.  Discussed allowing 6 to 8 weeks for healing after this procedure.  We will monitor her menses in that this time.  If patient is still interested, we can then schedule hysterectomy.    I spent 30 minutes caring for Earl Maciel on this date of service. This time includes time spent by me in the following activities: preparing for the visit, reviewing tests, obtaining and/or reviewing a separately obtained history, performing a medically appropriate examination and/or evaluation, counseling and educating the patient/family/caregiver, ordering medications, tests, or procedures and documenting information in the medical record.  This time does NOT include time spent on separately reported services.      Malinda Conway MD   7/25/2024 08:54 EDT

## 2024-08-19 ENCOUNTER — OFFICE VISIT (OUTPATIENT)
Dept: INTERNAL MEDICINE | Facility: CLINIC | Age: 29
End: 2024-08-19
Payer: COMMERCIAL

## 2024-08-19 VITALS
HEIGHT: 60 IN | SYSTOLIC BLOOD PRESSURE: 126 MMHG | TEMPERATURE: 98.7 F | WEIGHT: 214 LBS | DIASTOLIC BLOOD PRESSURE: 87 MMHG | HEART RATE: 94 BPM | OXYGEN SATURATION: 99 % | BODY MASS INDEX: 42.01 KG/M2

## 2024-08-19 DIAGNOSIS — R10.84 GENERALIZED ABDOMINAL PAIN: ICD-10-CM
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
## 2024-08-19 DIAGNOSIS — S72.002D CLOSED FRACTURE OF LEFT HIP WITH ROUTINE HEALING, SUBSEQUENT ENCOUNTER: Primary | ICD-10-CM

## 2024-08-19 PROCEDURE — 99213 OFFICE O/P EST LOW 20 MIN: CPT

## 2024-08-19 RX ORDER — HYDROCODONE BITARTRATE AND ACETAMINOPHEN 5; 325 MG/1; MG/1
TABLET ORAL
COMMUNITY
Start: 2024-08-17

## 2024-08-19 RX ORDER — IBUPROFEN 800 MG/1
800 TABLET ORAL EVERY 6 HOURS PRN
Qty: 30 TABLET | Refills: 1 | Status: SHIPPED | OUTPATIENT
Start: 2024-08-19

## 2024-08-19 RX ORDER — METHOCARBAMOL 500 MG/1
1 TABLET, FILM COATED ORAL 3 TIMES DAILY
COMMUNITY
Start: 2024-08-17

## 2024-08-19 NOTE — PATIENT INSTRUCTIONS
Hip Fracture    A hip fracture is a break in the upper part of the thigh bone (femur). This is usually the result of an injury, commonly a fall.  What are the causes?  This condition may be caused by:  A direct hit or injury (trauma) to the side of the hip, such as from a fall or a car accident.  What increases the risk?  You are more likely to develop this condition if:  You have poor balance or an unsteady walking pattern (gait). Certain conditions contribute to poor balance, including Parkinson disease and dementia.  You have thinning or weakening of your bones, such as from osteopenia or osteoporosis.  You have cancer that spreads to the leg bones.  You have certain conditions that can weaken your bones, such as thyroid disorders, intestine disorders, or a lack (deficiency) of certain nutrients.  You smoke.  You take certain medicines, such as steroids.  You have a history of broken bones.  What are the signs or symptoms?  Symptoms of this condition include:  Pain over the injured hip. This is commonly felt on the side of the hip or in the front groin area.  Stiffness, bruising, and swelling over the hip.  Pain with movement of the leg, especially lifting it up. Pain often gets better with rest.  Difficulty or inability to stand, walk, or use the leg to support body weight (put weight on the leg).  The leg rolling outward when lying down.  The affected leg being shorter than the other leg.  How is this diagnosed?  This condition may be diagnosed based on:  Your symptoms.  A physical exam.  X-rays. These may be done:  To confirm the diagnosis.  To determine the type and location of the fracture.  To check for other injuries.  MRI or CT scans. These may be done if the fracture is not visible on an X-ray.  How is this treated?  Treatment for this condition depends on the severity and location of your fracture. In most cases, surgery is necessary. Surgery may involve:  Repairing the fracture with a screw, nail, or  leslie to hold the bone in place (open reduction and internal fixation, ORIF).  Replacing the damaged parts of the femur with metal implants (hemiarthroplasty or arthroplasty).  If your fracture is less severe, or if you are not eligible for surgery, you may have non-surgical treatment. Non-surgical treatment may involve:  Using crutches, a walker, or a wheelchair until your health care provider says that you can support (bear) weight on your hip.  Medicines to help reduce pain and swelling.  Having regular X-rays to monitor your fracture and make sure that it is healing.  Physical therapy. You may need physical therapy after surgery, too.  Follow these instructions at home:  Activity  Do not use your injured leg to support your body weight until your health care provider says that you can.  Follow standing and walking restrictions as told by your health care provider.  Use crutches, a walker, or a wheelchair as directed.  Avoid any activities that cause pain or irritation in your hip. Ask your health care provider what activities are safe for you.  Do not drive or use heavy machinery until your health care provider approves.  If physical therapy was prescribed, do exercises as told by your health care provider.  General instructions    Take over-the-counter and prescription medicines only as told by your health care provider.  If directed, put ice on the injured area:  Put ice in a plastic bag.  Place a towel between your skin and the bag.  Leave the ice on for 20 minutes, 2-3 times a day.  Do not use any products that contain nicotine or tobacco, such as cigarettes and e-cigarettes. These can delay bone healing. If you need help quitting, ask your health care provider.  Keep all follow-up visits as told by your health care provider. This is important.  How is this prevented?  To prevent falls at home:  Use a cane, walker, or wheelchair as directed.  Make sure your rooms and hallways are free of clutter, obstacles,  and cords.  Install grab bars in your bedroom and bathrooms.  Always use handrails when going up and down stairs.  Use nightlights around the house.  Exercise regularly. Ask what forms of exercise are safe for you, such as walking and strength and balance exercises.  Visit an eye doctor regularly to have your eyesight checked. This can help prevent falls.  Make sure you get enough calcium and vitamin D.  Do not use any products that contain nicotine or tobacco, such as cigarettes and e-cigarettes. If you need help quitting, ask your health care provider.  Limit alcohol use.  If you have an underlying condition that caused your hip fracture, work with your health care provider to manage your condition.  Contact a health care provider if:  Your pain gets worse or it does not get better with rest or medicine.  You develop any of the following in your leg or foot:  Numbness.  Tingling.  A change in skin color (discoloration).  Skin feeling cold to the touch.  Get help right away if:  Your pain suddenly gets worse.  You cannot move your hip.  Summary  A hip fracture is a break in the upper part of the thigh bone (femur).  Treatment typically requires surgical management to restore stability and function to the hip.  Pain medicine and icing of the affected leg can help manage pain and swelling. Follow directions as told by your health care provider.  This information is not intended to replace advice given to you by your health care provider. Make sure you discuss any questions you have with your health care provider.  Document Revised: 08/20/2021 Document Reviewed: 08/20/2021  [employer] Patient Education © 2022 [employer]
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
## 2024-08-20 ENCOUNTER — TELEPHONE (OUTPATIENT)
Dept: ORTHOPEDIC SURGERY | Facility: CLINIC | Age: 29
End: 2024-08-20
Payer: COMMERCIAL
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
## 2024-08-23 ENCOUNTER — PATIENT MESSAGE (OUTPATIENT)
Dept: OBSTETRICS AND GYNECOLOGY | Age: 29
End: 2024-08-23
Payer: COMMERCIAL

## 2024-08-23 NOTE — TELEPHONE ENCOUNTER
From: Earl Maciel  To: Malinda Conway  Sent: 8/23/2024 11:45 AM EDT  Subject: Surgery    Good morning as you know I have a break in my hip I was just a little curious if this was actual cancer due to the procedure keep having to get pushed back I’m just getting a little nervous

## 2024-08-29 DIAGNOSIS — S72.002D CLOSED FRACTURE OF LEFT HIP WITH ROUTINE HEALING, SUBSEQUENT ENCOUNTER: Primary | ICD-10-CM

## 2024-08-29 RX ORDER — METHOCARBAMOL 500 MG/1
500 TABLET, FILM COATED ORAL 3 TIMES DAILY
Qty: 90 TABLET | Refills: 0 | Status: SHIPPED | OUTPATIENT
Start: 2024-08-29

## 2024-10-10 ENCOUNTER — TREATMENT (OUTPATIENT)
Age: 29
End: 2024-10-10
Payer: COMMERCIAL

## 2024-10-10 DIAGNOSIS — M25.652 IMPAIRED RANGE OF MOTION OF LEFT HIP: ICD-10-CM

## 2024-10-10 DIAGNOSIS — S32.425D CLOSED NONDISPLACED FRACTURE OF POSTERIOR WALL OF LEFT ACETABULUM WITH ROUTINE HEALING, SUBSEQUENT ENCOUNTER: Primary | ICD-10-CM

## 2024-10-10 DIAGNOSIS — R26.89 IMPAIRED GAIT AND MOBILITY: ICD-10-CM

## 2024-10-10 DIAGNOSIS — R29.898 IMPAIRED STRENGTH OF LOWER EXTREMITY: ICD-10-CM

## 2024-10-10 NOTE — PATIENT INSTRUCTIONS
Access Code: [email or web address]  URL: [email or web address]  Date: 10/10/2024  Prepared by: Romana Vargas    Exercises  - Supine Quad Set  - 2 x daily - 7 x weekly - 1 sets - 10 reps - 3-5 sec. hold  - Supine Gluteal Sets  - 2 x daily - 7 x weekly - 1 sets - 10 reps - 3-5 sec. hold  - Supine Transversus Abdominis Palpation  - 2 x daily - 7 x weekly - 1 sets - 10 reps - 3-5 sec. hold  - Supine Hip Internal and External Rotation  - 2 x daily - 7 x weekly - 1 sets - 10 reps  - Seated Long Arc Quad  - 2 x daily - 7 x weekly - 1 sets - 10 reps - 5-10 sec. hold
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
## 2024-10-15 ENCOUNTER — TREATMENT (OUTPATIENT)
Age: 29
End: 2024-10-15
Payer: COMMERCIAL

## 2024-10-15 DIAGNOSIS — R29.898 IMPAIRED STRENGTH OF LOWER EXTREMITY: ICD-10-CM

## 2024-10-15 DIAGNOSIS — M25.652 IMPAIRED RANGE OF MOTION OF LEFT HIP: ICD-10-CM

## 2024-10-15 DIAGNOSIS — R26.89 IMPAIRED GAIT AND MOBILITY: ICD-10-CM

## 2024-10-15 DIAGNOSIS — S32.425D CLOSED NONDISPLACED FRACTURE OF POSTERIOR WALL OF LEFT ACETABULUM WITH ROUTINE HEALING, SUBSEQUENT ENCOUNTER: Primary | ICD-10-CM

## 2024-10-15 PROCEDURE — 97110 THERAPEUTIC EXERCISES: CPT | Performed by: PHYSICAL THERAPIST

## 2024-10-15 PROCEDURE — 97140 MANUAL THERAPY 1/> REGIONS: CPT | Performed by: PHYSICAL THERAPIST

## 2024-10-15 NOTE — PROGRESS NOTES
Physical Therapy Treatment Note  [facility] [place]   [address] [place] [address]  P: [phone]       F: [phone]      Patient: Earl Maciel   : 1995  Treatment Diagnosis:     ICD-10-CM ICD-9-CM   1. Closed nondisplaced fracture of posterior wall of left acetabulum with routine healing, subsequent encounter  S32.425D V54.19   2. Impaired range of motion of left hip  M25.652 719.55   3. Impaired strength of lower extremity  R29.898 729.89   4. Impaired gait and mobility  R26.89 781.2     Referring practitioner: Staci Calderón,*  Date of Initial Visit: Type: THERAPY  Noted: 10/10/2024  Today's Date: 10/15/2024  Patient seen for 2 sessions           Subjective   Patient reports she has been having some occasional sharp pains in her hip when she is sitting.  States she is also having some left low back pain.    Objective     See Exercise, Manual, and Modality Logs for complete treatment.       Assessment/Plan  Patient presents with tenderness to palpation at the anterior hip region and the TFL muscle and proximal rectus femoris muscle.  She is also having tenderness to palpation over the left SI joint.  She performed progressed exercises for mobility.  She tolerated progression well with no adverse symptoms.  She responded positively to manual therapy with improved left hip mobility and muscle flexibility as well as decreased tenderness to palpation of the muscles in the anterior hip region.  Progressed HEP and provided written instructions for home use.  Progress per Plan of Care           Timed:         Manual Therapy:    10     mins  13407;     Therapeutic Exercise:    25     mins  32001;     Neuromuscular Maribel:        mins  54981;    Therapeutic Activity:          mins  94632;     Gait Training:           mins  57136;     Ultrasound:          mins  43426;    Ionto                                  mins  24983  Self Care                             mins  21359  Canalith Repos         mins  33176  Orthotic MGMT/Train         mins  68924    Un-Timed:  Electrical Stimulation:         mins  44167 ( );  Dry Needling:          mins  71533 self-pay;  Dry Needling:          mins  95979 self-pay  Traction          mins  05223      Timed Treatment:   35   mins   Total Treatment:     45   mins        PT SIGNATURE: Romana Vargas PT     License Number: [id number]  Electronically signed by Romana Vargas PT, 10/15/24, 12:00 PM EDT

## 2024-10-22 ENCOUNTER — TREATMENT (OUTPATIENT)
Age: 29
End: 2024-10-22
Payer: COMMERCIAL

## 2024-10-22 DIAGNOSIS — S32.425D CLOSED NONDISPLACED FRACTURE OF POSTERIOR WALL OF LEFT ACETABULUM WITH ROUTINE HEALING, SUBSEQUENT ENCOUNTER: Primary | ICD-10-CM

## 2024-10-22 DIAGNOSIS — R26.89 IMPAIRED GAIT AND MOBILITY: ICD-10-CM

## 2024-10-22 DIAGNOSIS — R29.898 IMPAIRED STRENGTH OF LOWER EXTREMITY: ICD-10-CM

## 2024-10-22 DIAGNOSIS — M25.652 IMPAIRED RANGE OF MOTION OF LEFT HIP: ICD-10-CM

## 2024-10-22 PROCEDURE — 97110 THERAPEUTIC EXERCISES: CPT | Performed by: PHYSICAL THERAPIST

## 2024-10-22 PROCEDURE — 97530 THERAPEUTIC ACTIVITIES: CPT | Performed by: PHYSICAL THERAPIST

## 2024-10-22 NOTE — PROGRESS NOTES
Physical Therapy Treatment Note  [facility] [place]   [address] [place] [address]  P: [phone]       F: [phone]      Patient: Earl Maciel   : 1995  Treatment Diagnosis:     ICD-10-CM ICD-9-CM   1. Closed nondisplaced fracture of posterior wall of left acetabulum with routine healing, subsequent encounter  S32.425D V54.19   2. Impaired range of motion of left hip  M25.652 719.55   3. Impaired strength of lower extremity  R29.898 729.89   4. Impaired gait and mobility  R26.89 781.2     Referring practitioner: Staci Calderón,*  Date of Initial Visit: Type: THERAPY  Noted: 10/10/2024  Today's Date: 10/22/2024  Patient seen for 3 sessions           Subjective   Patient reports her hip has been less painful.  Still having pain with prolonged walking in the back side of the hip.  Reports she is having swelling in her foot at the end of the day.     Objective     See Exercise, Manual, and Modality Logs for complete treatment.       Assessment/Plan  Patient performed exercise program with progressed exercises and parameters.  She tolerated progression well with no adverse symptoms.  Continues to have tightness in the left hip primarily with reaching 90 degrees hip flexion.  Completed patient/family training on hip precautions and on assisting with passive range of motion to the left hip.  Progress per Plan of Care           Timed:         Manual Therapy:         mins  16075;     Therapeutic Exercise:    30     mins  45841;    Neuromuscular Maribel:        mins  94195;    Therapeutic Activity:     8     mins  06026;     Gait Training:           mins  44333;     Ultrasound:          mins  20721;    Ionto                                  mins  75112  Self Care                            mins  36102  Canalith Repos         mins  31457  Orthotic MGMT/Train         mins  36778    Un-Timed:  Electrical Stimulation:         mins  92478 ( );  Dry  Needling:          mins  06787 self-pay;  Dry Needling:          mins  38358 self-pay  Traction          mins  25952  Group Therapy:          mins  12910;    Timed Treatment:   38   mins   Total Treatment:     38   mins        PT SIGNATURE: Romana Vargas PT     License Number: [id number]  Electronically signed by Romana Vargas PT, 10/22/24, 12:01 PM EDT

## 2024-10-25 ENCOUNTER — TREATMENT (OUTPATIENT)
Age: 29
End: 2024-10-25
Payer: COMMERCIAL

## 2024-10-25 DIAGNOSIS — R29.898 IMPAIRED STRENGTH OF LOWER EXTREMITY: ICD-10-CM

## 2024-10-25 DIAGNOSIS — R26.89 IMPAIRED GAIT AND MOBILITY: ICD-10-CM

## 2024-10-25 DIAGNOSIS — S32.425D CLOSED NONDISPLACED FRACTURE OF POSTERIOR WALL OF LEFT ACETABULUM WITH ROUTINE HEALING, SUBSEQUENT ENCOUNTER: Primary | ICD-10-CM

## 2024-10-25 DIAGNOSIS — M25.652 IMPAIRED RANGE OF MOTION OF LEFT HIP: ICD-10-CM

## 2024-10-25 NOTE — PROGRESS NOTES
Physical Therapy Treatment Note  [facility] Physical Therapy [place]   [address] [place] [address]  P: [phone]       F: [phone]      Patient: Earl Maciel   : 1995  Treatment Diagnosis:     ICD-10-CM ICD-9-CM   1. Closed nondisplaced fracture of posterior wall of left acetabulum with routine healing, subsequent encounter  S32.425D V54.19   2. Impaired range of motion of left hip  M25.652 719.55   3. Impaired strength of lower extremity  R29.898 729.89   4. Impaired gait and mobility  R26.89 781.2     Referring practitioner: Staci Calderón,*  Date of Initial Visit: Type: THERAPY  Noted: 10/10/2024  Today's Date: 10/25/2024  Patient seen for 4 sessions           Subjective   Patient reports she is doing well with her home stretches and exercises.  States she is getting more confident walking with letting her foot touch the ground.  Still having discomfort if she walks too long.    Objective     See Exercise, Manual, and Modality Logs for complete treatment.       Assessment/Plan  Patient completed exercise program for range of motion and strengthening with progress parameters.  She tolerated progression well with no adverse symptoms.  Will continue to progress as tolerated within restrictions.  Progress per Plan of Care and Progress strengthening /stabilization /functional activity           Timed:         Manual Therapy:         mins  12091;     Therapeutic Exercise:    30     mins  11174;    Neuromuscular Maribel:        mins  10864;    Therapeutic Activity:          mins  46814;     Gait Training:           mins  19623;     Ultrasound:          mins  15896;    Ionto                                  mins  88429  Self Care                            mins  98512  Canalith Repos         mins  12291  Orthotic MGMT/Train         mins  66138    Un-Timed:  Electrical Stimulation:         mins  31216 ( );  Dry Needling:          mins  52739 self-pay;  Dry  Needling:          mins  33734 self-pay  Traction          mins  36532  Group Therapy:          mins  16676;    Timed Treatment:   30   mins   Total Treatment:     30   mins        PT SIGNATURE: Romana Vargas PT     License Number: [id number]  Electronically signed by Romana Vargas PT, 10/25/24, 12:12 PM EDT

## 2024-10-28 ENCOUNTER — TREATMENT (OUTPATIENT)
Age: 29
End: 2024-10-28
Payer: COMMERCIAL

## 2024-10-28 DIAGNOSIS — S32.425D CLOSED NONDISPLACED FRACTURE OF POSTERIOR WALL OF LEFT ACETABULUM WITH ROUTINE HEALING, SUBSEQUENT ENCOUNTER: Primary | ICD-10-CM

## 2024-10-28 DIAGNOSIS — M25.652 IMPAIRED RANGE OF MOTION OF LEFT HIP: ICD-10-CM

## 2024-10-28 DIAGNOSIS — R29.898 IMPAIRED STRENGTH OF LOWER EXTREMITY: ICD-10-CM

## 2024-10-28 DIAGNOSIS — R26.89 IMPAIRED GAIT AND MOBILITY: ICD-10-CM

## 2024-10-28 NOTE — PROGRESS NOTES
Physical Therapy Daily Treatment Note    [facility] PT - [facility]  [address]  [address]  [place] [address]     Patient: Earl Maciel   : 1995  Referring practitioner: Staci Calderón,*  Date of Initial Visit: Type: THERAPY  Noted: 10/10/2024  Today's Date: 10/28/2024  Patient seen for 5 sessions         Earl Maciel reports: That she seems to be doing better in regards to walking, though still difficulty putting left heel/foot flat on the ground due to hip discomfort.        Subjective     Objective   Ambulates in/out of clinic with bilateral crutches and maintenance of weightbearing status per MD for left lower extremity.  She is noted to ambulate with decreased left step/stride length and places more of her weight on the ball of her foot versus exhibiting heel strike.    See Exercise, Manual, and Modality Logs for complete treatment.   Exercise rationale/ pain free exercise performance  Anatomy and structure of affected musculature  Posture/Postural awareness of trunk with assistive device ambulation  Working on ambulating with heel strike/mid stance and toe off in allowable weight bearing status.  Sleeping positions with pillows  Ice application  Alternate exercise positions  Verbal/Tactile cues to ensure correct exercise performance/technique    Added:nustep.hip abd SLR L LE, standing heel/toe raises in parallel bars with upper extremity support for weightbearing maintenance.      Assessment/Plan Compliant/Cooperative with rehab efforts this session.  Subjectively reports no increased symptoms or discomfort with therapeutic exercise today.  Able to perform increased exercise/functional activity without increased left hip pain/discomfort only early fatigue with prolonged activity performance/repetitions.  Benefits from verbal/tactile cues to ensure proper L LE gait in regards to heel strike/foot flat..          Progress strengthening /stabilization /functional activity  as symptoms allow.  Added gastroc stretch next visit.           Timed:  Manual Therapy:         mins  55334;  Therapeutic Exercise:    25     mins  66149;     Neuromuscular Maribel:        mins  04048;    Therapeutic Activity:      10    mins  86172;     Gait Training:           mins  62929;     Ultrasound:          mins  02443;    Self Care                    _12__      mins 77265    Untimed:  Electrical Stimulation:         mins  29028 ( );  Mechanical Traction:         mins  63187;     Timed Treatment:  47    mins   Total Treatment:     47   mins  Pipe Coleman PTA  Physical Therapist  Assistant  [id number]

## 2024-10-30 ENCOUNTER — TREATMENT (OUTPATIENT)
Age: 29
End: 2024-10-30
Payer: COMMERCIAL

## 2024-10-30 DIAGNOSIS — M25.652 IMPAIRED RANGE OF MOTION OF LEFT HIP: ICD-10-CM

## 2024-10-30 DIAGNOSIS — S32.425D CLOSED NONDISPLACED FRACTURE OF POSTERIOR WALL OF LEFT ACETABULUM WITH ROUTINE HEALING, SUBSEQUENT ENCOUNTER: Primary | ICD-10-CM

## 2024-10-30 DIAGNOSIS — R26.89 IMPAIRED GAIT AND MOBILITY: ICD-10-CM

## 2024-10-30 DIAGNOSIS — R29.898 IMPAIRED STRENGTH OF LOWER EXTREMITY: ICD-10-CM

## 2024-10-30 NOTE — PROGRESS NOTES
Physical Therapy Daily Treatment Note    [facility] - [facility]  [address]  [address]  [place] [address]     Patient: Earl Maciel   : 1995  Referring practitioner: Staci Calderón,*  Date of Initial Visit: Type: THERAPY  Noted: 10/10/2024  Today's Date: 10/30/2024  Patient seen for 6 sessions         Earl Maciel reports: a little left hip soreness from previous session exercise/activity, though went away by next day.        Subjective     Objective   -ambulates in/out of clinic with bilateral crutches and maintenance of appropriate L LE weight bearing status.  -noted still ambulates with inability to put left heel down for heel strike and foot flat due to discomfort   Noted improved L foot heel strike/foot flat with ambulation.    See Exercise, Manual, and Modality Logs for complete treatment.   Added:  unilateral BKFO with blue t band, ppt with TA contraction and resistive hip flexion marches with t band, gastroc stretch L      Assessment/Plan  Able to progress exercise/activity and repetitions for left hip/LE without increased symptoms and discomfort.  Active and passive L gastroc stretching beneficial in allowing improved comfort with L foot/ankle heel strike and foot flat with assistive device ambulation.        Progress per Plan of Care toward all goals.           Timed:  Manual Therapy:     8    mins  50812;  Therapeutic Exercise:    25     mins  33553;     Neuromuscular Maribel:        mins  90608;    Therapeutic Activity:   12       mins  38700;     Gait Training:           mins  21071;     Ultrasound:          mins  78794;    Self Care                    ___      mins 45931    Untimed:  Electrical Stimulation:         mins  46368 ( );  Mechanical Traction:         mins  24529;     Timed Treatment:   45   mins   Total Treatment:    45    mins  Pipe Coleman PTA  Physical Therapist  Assistant  [id number]

## 2024-11-05 ENCOUNTER — TREATMENT (OUTPATIENT)
Age: 29
End: 2024-11-05
Payer: COMMERCIAL

## 2024-11-05 DIAGNOSIS — R26.89 IMPAIRED GAIT AND MOBILITY: ICD-10-CM

## 2024-11-05 DIAGNOSIS — M25.652 IMPAIRED RANGE OF MOTION OF LEFT HIP: ICD-10-CM

## 2024-11-05 DIAGNOSIS — R29.898 IMPAIRED STRENGTH OF LOWER EXTREMITY: ICD-10-CM

## 2024-11-05 DIAGNOSIS — S32.425D CLOSED NONDISPLACED FRACTURE OF POSTERIOR WALL OF LEFT ACETABULUM WITH ROUTINE HEALING, SUBSEQUENT ENCOUNTER: Primary | ICD-10-CM

## 2024-11-05 NOTE — PROGRESS NOTES
Physical Therapy Re-Assessment   [facility] Physical Therapy [place]   [address] [place] [address]  P: [phone]       F: [phone]        Patient: Earl Maciel   : 1995  Visit Diagnoses:     ICD-10-CM ICD-9-CM   1. Closed nondisplaced fracture of posterior wall of left acetabulum with routine healing, subsequent encounter  S32.425D V54.19   2. Impaired range of motion of left hip  M25.652 719.55   3. Impaired strength of lower extremity  R29.898 729.89   4. Impaired gait and mobility  R26.89 781.2     Referring practitioner: Staci Calderón,*  Date of Initial Visit: Type: THERAPY  Noted: 10/10/2024  Today's Date: 2024  Patient seen for 7 sessions      Subjective:   Earl Palominoarley reports:   Subjective Questionnaire: LEFS: 39/80  Clinical Progress: improved  Home Program Compliance: Yes  Treatment has included: therapeutic exercise, manual therapy, therapeutic activity, gait training, and cryotherapy    Subjective Evaluation    Pain  At best pain ratin  At worst pain ratin       Patient reports she has been having a little more soreness but has been doing more exercises.  States she has not been having back pain lately.    Objective     Objective        Palpation   Left   Tenderness of the gluteus medius, lumbar paraspinals, piriformis and TFL.      Tenderness      Left Hip   Tenderness in the greater trochanter and sacroiliac joint.      Active Range of Motion   Left Hip   Flexion: 80 degrees   Extension: 0 degrees   Abduction: 35 degrees   External rotation (90/90): 30 degrees         Strength/Myotome Testing      Deferred due to fracture status/precautions.        Ambulation   Weight-Bearing Status   Weight-Bearing Status (Left): toe-touch weight-bearing   30%  Assistive device used: crutches        See Exercise, Manual, and Modality Logs for complete treatment.     Assessment/Plan  Patient demonstrates improved left hip range of motion and  functional strength.  Gait pattern has improved and she is tolerating restricted weightbearing without increased pain.  She performed program with progress parameters and additional strengthening exercises.  She tolerated progression well with no adverse symptoms.  Will continue to progress as tolerated.  Progress toward previous goals: Partially met    Goal Review  Short Term Goals (4-6 wks):  1.  Patient will have increased left hip flexion to 120 degrees.-progressing  2.  Patient will have increased left hip abduction to 30 degrees.-progressing  3.  Patient will have increased left hip external rotation to 40 degrees.-progressing  4.  Patient will demonstrate functional gait pattern using appropriate assistive device with an weightbearing restrictions.-met  5.  Patient will have successfully integrated posterior hip precautions and to ADL/IADL/work tasks.-met     Long Term Goals (8-10 weeks):  1.  Patient will be independent in performance of HEP for carryover upon discharge from skilled PT services.-progressing  2.  Patient will have normalized gait mechanics.-progressing  3.  Patient will have increased left hip strength to 4+/5.-ongoing  4.  Patient will be able to perform ADLs/IADLs and work tasks without limitations.-ongoing  5.  Patient will be able to ascend and descend stairs with reciprocal pattern.-ongoing      Recommendations: Continue as planned  Timeframe: 2 months  Prognosis to achieve goals: good    PT SIGNATURE: Romana Vargas, PT     License Number: [id number]  Electronically signed by Romana Vargas PT, 11/05/24, 1:35 PM EST        Timed:         Manual Therapy:         mins  39785;     Therapeutic Exercise:    40     mins  30869;     Neuromuscular Maribel:        mins  80022;    Therapeutic Activity:          mins  04221;     Gait Training:           mins  82080;     Ultrasound:          mins  13429;    Ionto                                  mins  75081  Self Care                            mins   69009  Canalith Repos         mins  17570  Orthotic MGMT/Train         mins  28667    Un-Timed:  Electrical Stimulation:         mins  89325 ( );  Dry Needling:          mins  16183 self-pay;  Dry Needling:          mins  46543 self-pay  Traction          mins  15871  Low Eval          mins  99978  Mod Eval          mins  25761  High Eval                            mins  77446    Timed Treatment:   40   mins   Total Treatment:     40   mins

## 2024-11-06 ENCOUNTER — OFFICE VISIT (OUTPATIENT)
Dept: INTERNAL MEDICINE | Facility: CLINIC | Age: 29
End: 2024-11-06
Payer: COMMERCIAL

## 2024-11-06 VITALS
DIASTOLIC BLOOD PRESSURE: 74 MMHG | HEIGHT: 60 IN | OXYGEN SATURATION: 99 % | TEMPERATURE: 98.1 F | WEIGHT: 218 LBS | BODY MASS INDEX: 42.8 KG/M2 | HEART RATE: 70 BPM | SYSTOLIC BLOOD PRESSURE: 126 MMHG

## 2024-11-06 DIAGNOSIS — E66.813 CLASS 3 SEVERE OBESITY DUE TO EXCESS CALORIES WITH BODY MASS INDEX (BMI) OF 40.0 TO 44.9 IN ADULT, UNSPECIFIED WHETHER SERIOUS COMORBIDITY PRESENT: Primary | ICD-10-CM

## 2024-11-06 DIAGNOSIS — E66.01 CLASS 3 SEVERE OBESITY DUE TO EXCESS CALORIES WITH BODY MASS INDEX (BMI) OF 40.0 TO 44.9 IN ADULT, UNSPECIFIED WHETHER SERIOUS COMORBIDITY PRESENT: Primary | ICD-10-CM

## 2024-11-06 PROCEDURE — 99213 OFFICE O/P EST LOW 20 MIN: CPT

## 2024-11-06 RX ORDER — SEMAGLUTIDE 0.25 MG/.5ML
0.25 INJECTION, SOLUTION SUBCUTANEOUS WEEKLY
Qty: 0.5 ML | Refills: 2 | Status: SHIPPED | OUTPATIENT
Start: 2024-11-06

## 2024-11-06 NOTE — PROGRESS NOTES
Chief Complaint  New Med Request and Obesity     Subjective:      History of Present Illness {CC  Problem List  Visit  Diagnosis   Encounters  Notes  Medications  Labs  Result Review Imaging  Media :23}     Earl Maciel presents to [facility] for:      History of Present Illness        The patient presents for evaluation of weight loss.    She is currently on a weight loss journey, which involves reducing her food intake. However, due to a recent hip fracture, she is unable to engage in physical exercise. She has been making an effort to consume healthier food options. She has no history of thyroid cancer or pancreatitis. She was previously diagnosed as prediabetic.    FAMILY HISTORY  She does not have a family history of medullary or thyroid C cell tumors.         I have reviewed patient's medical history, any new submitted information provided by patient or medical assistant and updated medical record.      Objective:      Physical Exam  Vitals reviewed.   Constitutional:       Appearance: Normal appearance.   HENT:      Head: Normocephalic.   Cardiovascular:      Rate and Rhythm: Normal rate and regular rhythm.      Pulses: Normal pulses.      Heart sounds: Normal heart sounds.   Pulmonary:      Effort: Pulmonary effort is normal.      Breath sounds: Normal breath sounds.   Skin:     General: Skin is warm and dry.      Capillary Refill: Capillary refill takes less than 2 seconds.   Neurological:      General: No focal deficit present.      Mental Status: She is alert.      Gait: Gait abnormal (uses crutches d/t hip fracture).   Psychiatric:         Mood and Affect: Mood normal.         Behavior: Behavior normal.        Result Review  Data Reviewed:{ Labs  Result Review  Imaging  Med Tab  Media :23}     Results                    Vital Signs:   /74 (BP Location: Left arm, Patient Position: Sitting, Cuff Size: Large Adult)   Pulse 70   Temp 98.1 °F  "(36.7 °C) (Oral)   Ht 152.4 cm (60\")   Wt 98.9 kg (218 lb)   SpO2 99%   BMI 42.58 kg/m²     BMI is >= 25 and <30. (Overweight) The following options were offered after discussion;: weight loss educational material (shared in after visit summary), exercise counseling/recommendations, and nutrition counseling/recommendations  Class 2 Severe Obesity (BMI >=35 and <=39.9). Obesity-related health conditions include the following: none. Obesity is newly identified. BMI is is above average; BMI management plan is completed. We discussed low calorie, low carb based diet program, portion control, increasing exercise, and pharmacologic options including semaglutide .          Requested Prescriptions     Signed Prescriptions Disp Refills    Semaglutide-Weight Management (Wegovy) 0.25 MG/0.5ML solution auto-injector 0.5 mL 2     Sig: Inject 0.5 mL under the skin into the appropriate area as directed 1 (One) Time Per Week.       Routine medications provided by this office will also be refilled via pharmacy request.       Current Outpatient Medications:     ibuprofen (ADVIL,MOTRIN) 800 MG tablet, Take 1 tablet by mouth Every 6 (Six) Hours As Needed for Mild Pain or Moderate Pain., Disp: 30 tablet, Rfl: 1    omeprazole (priLOSEC) 20 MG capsule, Take 2 capsules by mouth Daily., Disp: 180 capsule, Rfl: 3    methocarbamol (ROBAXIN) 500 MG tablet, Take 1 tablet by mouth 3 (Three) Times a Day., Disp: 60 tablet, Rfl: 0    Semaglutide-Weight Management (Wegovy) 0.25 MG/0.5ML solution auto-injector, Inject 0.5 mL under the skin into the appropriate area as directed 1 (One) Time Per Week., Disp: 0.5 mL, Rfl: 2     Assessment and Plan:      Assessment and Plan {CC Problem List  Visit Diagnosis  ROS  Review (Popup)  Health Maintenance  Quality  BestPractice  Medications  SmartSets  SnapShot Encounters  Media :23}     Problem List Items Addressed This Visit    None  Visit Diagnoses       Class 3 severe obesity due to excess " calories with body mass index (BMI) of 40.0 to 44.9 in adult, unspecified whether serious comorbidity present    -  Primary    Relevant Medications    Semaglutide-Weight Management (Wegovy) 0.25 MG/0.5ML solution auto-injector               1. Obesity due to excess calories.  A prescription for compounded semaglutide 0.25 mg once a week for 6 weeks was provided. She was advised to take MiraLAX daily, increase her water intake, and consume more green leafy vegetables, kale salads, fruits, and vinegar. The compounding pharmacy used is [facility], which will mail the medication to her house. She was encouraged to use Weight Watchers or a similar program to help manage her weight and potentially avoid increasing the dosage of semaglutide.       Patient verbalizes understanding and is comfortable with the plan of care.      Patient is aware semaglutide will be filled by compounding pharmacy.  Patient is aware this is not FDA approved.  Patient still wishes to continue getting medication from compounding pharmacy due to lack of insurance coverage and/or drug shortage.  Patient educated fully on risks of taking this medication.  Patient verbalized understanding and would like to proceed.     Follow Up {Instructions Charge Capture  Follow-up Communications :23}     Return in about 4 weeks (around 12/4/2024) for Recheck.      Patient was given instructions and counseling regarding her condition or for health maintenance advice. Please see specific information pulled into the AVS if appropriate.    Ursz[place]on disclaimer:   Much of this encounter note is an electronic transcription/translation of spoken language to printed text. The electronic translation of spoken language may permit erroneous, or at times, nonsensical words or phrases to be inadvertently transcribed; Although I have reviewed the note for such errors, some may still exist.         Additional Patient Counseling:       There are no Patient Instructions on  file for this visit.    Patient or patient representative verbalized consent for the use of Ambient Listening during the visit with  SIA Jackson for chart documentation. 11/17/2024  18:04 EST

## 2024-11-08 ENCOUNTER — TREATMENT (OUTPATIENT)
Age: 29
End: 2024-11-08
Payer: COMMERCIAL

## 2024-11-08 DIAGNOSIS — M25.652 IMPAIRED RANGE OF MOTION OF LEFT HIP: ICD-10-CM

## 2024-11-08 DIAGNOSIS — R26.89 IMPAIRED GAIT AND MOBILITY: ICD-10-CM

## 2024-11-08 DIAGNOSIS — S32.425D CLOSED NONDISPLACED FRACTURE OF POSTERIOR WALL OF LEFT ACETABULUM WITH ROUTINE HEALING, SUBSEQUENT ENCOUNTER: Primary | ICD-10-CM

## 2024-11-08 DIAGNOSIS — R29.898 IMPAIRED STRENGTH OF LOWER EXTREMITY: ICD-10-CM

## 2024-11-08 NOTE — PROGRESS NOTES
Physical Therapy Daily Treatment Note    [facility]  [address]  [address]  [place] [address]     Patient: Earl Maciel   : 1995  Referring practitioner: Staci Calderón,*  Date of Initial Visit: Type: THERAPY  Noted: 10/10/2024  Today's Date: 2024  Patient seen for 8 sessions         Earl Palominoarley reports: no new complaints left hip, continue to use crutches at home as directed by MD as well as working to keep full weight of left leg as instructed by MD        Subjective     Objective     See Exercise, Manual, and Modality Logs for complete treatment.       Assessment/Plan performing and progressing well with current exercise regimen.  Demonstrates ability for increased repetitions and prolonged exercise performance before needing rest break        Other  Recheck ROM, mm strength, progress toward goals, etc.  Follow up with Ortho MD on .           Timed:  Manual Therapy:         mins  79943;  Therapeutic Exercise:    28     mins  11442;     Neuromuscular Maribel:        mins  56601;    Therapeutic Activity:    10      mins  22872;     Gait Training:           mins  79621;     Ultrasound:          mins  89573;    Self Care                    ___      mins 11216    Untimed:  Electrical Stimulation:         mins  22454 ( );  Mechanical Traction:         mins  09595;     Timed Treatment: 38     mins   Total Treatment:    38    mins  Pipe Coleman PTA  Physical Therapist  Assistant  [id number]

## 2024-11-12 ENCOUNTER — TREATMENT (OUTPATIENT)
Age: 29
End: 2024-11-12
Payer: COMMERCIAL

## 2024-11-12 DIAGNOSIS — R29.898 IMPAIRED STRENGTH OF LOWER EXTREMITY: ICD-10-CM

## 2024-11-12 DIAGNOSIS — M25.652 IMPAIRED RANGE OF MOTION OF LEFT HIP: ICD-10-CM

## 2024-11-12 DIAGNOSIS — S32.425D CLOSED NONDISPLACED FRACTURE OF POSTERIOR WALL OF LEFT ACETABULUM WITH ROUTINE HEALING, SUBSEQUENT ENCOUNTER: Primary | ICD-10-CM

## 2024-11-12 DIAGNOSIS — R26.89 IMPAIRED GAIT AND MOBILITY: ICD-10-CM

## 2024-11-12 RX ORDER — METHOCARBAMOL 500 MG/1
500 TABLET, FILM COATED ORAL 3 TIMES DAILY
Qty: 60 TABLET | Refills: 0 | Status: SHIPPED | OUTPATIENT
Start: 2024-11-12

## 2024-11-12 NOTE — PROGRESS NOTES
"Physical Therapy Treatment & MD Note  [facility] Physical Therapy [place]   [address] [place] [address]  P: [phone]       F: [phone]      Patient: Earl Maciel   : 1995  Treatment Diagnosis:     ICD-10-CM ICD-9-CM   1. Closed nondisplaced fracture of posterior wall of left acetabulum with routine healing, subsequent encounter  S32.425D V54.19   2. Impaired range of motion of left hip  M25.652 719.55   3. Impaired strength of lower extremity  R29.898 729.89   4. Impaired gait and mobility  R26.89 781.2     Referring practitioner: Staci Calderón,*  Date of Initial Visit: Type: THERAPY  Noted: 10/10/2024  Today's Date: 2024  Patient seen for 9 sessions           Subjective   Patient reports she started having pain at about 3 PM yesterday while riding in her car.  States she did not have any event to provoke the pain.  States the pain feels deep in her hip, is a \"pulling\" pain and is sharp in nature.  States the intensity has not changed since onset.  Rates pain at 4/10. States laying on back with leg elevated helped the pain but as soon as she gets up and moves the pain returns. Patient states she sees the doctor tomorrow.    Objective          Palpation   Left   Tenderness of the iliopsoas, rectus femoris and TFL.     Active Range of Motion   Left Hip   Flexion: 60 degrees with pain  Extension: 0 degrees   External rotation (90/90): 25 degrees with pain        See Exercise, Manual, and Modality Logs for complete treatment.       Assessment/Plan  Patient presents with guarded left hip girdle muscles and pain limited ROM.  She responded positively to manual therapy with improved muscle tone, decreased TTP and improved active and passive ROM of the left hip.  Performed light exercises to supplement manual therapy interventions.  She tolerated program well with decreased pain intensity.  Reviewed WB and hip precautions.  Progress per Plan of Care       "     Timed:         Manual Therapy:    15     mins  59038;     Therapeutic Exercise:    10     mins  76117;    Neuromuscular Maribel:        mins  34339;    Therapeutic Activity:     8     mins  77739;     Gait Training:           mins  42153;     Ultrasound:          mins  39064;    Ionto                                  mins  06881  Self Care                            mins  44972  Canalith Repos         mins  59633  Orthotic MGMT/Train         mins  70210    Un-Timed:  Electrical Stimulation:         mins  08911 ( );  Dry Needling:          mins  58403 self-pay;  Dry Needling:          mins  52262 self-pay  Traction          mins  55299  Group Therapy:          mins  68216;    Timed Treatment:   33   mins   Total Treatment:     43   mins        PT SIGNATURE: Romana Vargas PT     License Number: [id number]  Electronically signed by Romana Vargas PT, 11/12/24, 1:37 PM EST

## 2024-11-19 ENCOUNTER — TREATMENT (OUTPATIENT)
Age: 29
End: 2024-11-19
Payer: COMMERCIAL

## 2024-11-19 DIAGNOSIS — S32.425D CLOSED NONDISPLACED FRACTURE OF POSTERIOR WALL OF LEFT ACETABULUM WITH ROUTINE HEALING, SUBSEQUENT ENCOUNTER: Primary | ICD-10-CM

## 2024-11-19 DIAGNOSIS — R29.898 IMPAIRED STRENGTH OF LOWER EXTREMITY: ICD-10-CM

## 2024-11-19 DIAGNOSIS — M25.652 IMPAIRED RANGE OF MOTION OF LEFT HIP: ICD-10-CM

## 2024-11-19 DIAGNOSIS — R26.89 IMPAIRED GAIT AND MOBILITY: ICD-10-CM

## 2024-11-19 PROCEDURE — 97110 THERAPEUTIC EXERCISES: CPT | Performed by: PHYSICAL THERAPIST

## 2024-11-19 NOTE — PROGRESS NOTES
Physical Therapy Treatment Note  [facility] Physical Therapy [place]   [address] [place] [address]  P: [phone]       F: [phone]      Patient: Earl Maciel   : 1995  Treatment Diagnosis:     ICD-10-CM ICD-9-CM   1. Closed nondisplaced fracture of posterior wall of left acetabulum with routine healing, subsequent encounter  S32.425D V54.19   2. Impaired range of motion of left hip  M25.652 719.55   3. Impaired strength of lower extremity  R29.898 729.89   4. Impaired gait and mobility  R26.89 781.2     Referring practitioner: Staci Calderón,*  Date of Initial Visit: Type: THERAPY  Noted: 10/10/2024  Today's Date: 2024  Patient seen for 10 sessions           Subjective   Patient reports the doctor released her to wean from the crutches.  States she has a new order to continue with PT.  Reports she has been putting more weight on her leg and able to walk without the crutch without any pain.    Objective     See Exercise, Manual, and Modality Logs for complete treatment.       Assessment/Plan  Patient performed exercise program with progressed exercises.  Benefits from cueing for technique and to prevent compensatory patterns.  Demonstrated stable gait pattern without assistive device.  Progressed HEP and updated electronic HEP.  Progress per Plan of Care           Timed:         Manual Therapy:         mins  11500;     Therapeutic Exercise:    30     mins  44177;    Neuromuscular Maribel:        mins  10478;    Therapeutic Activity:          mins  01646;     Gait Training:           mins  03162;     Ultrasound:          mins  91494;    Ionto                                  mins  81841  Self Care                            mins  73323  Canalith Repos         mins  01454  Orthotic MGMT/Train         mins  05371    Un-Timed:  Electrical Stimulation:         mins  99169 (MC );  Dry Needling:          mins  68099 self-pay;  Dry Needling:          mins   52759 self-pay  Traction          mins  61764  Group Therapy:          mins  50781;    Timed Treatment:   30   mins   Total Treatment:     30   mins        PT SIGNATURE: Romana Vargas PT     License Number: [id number]  Electronically signed by Romana Vargas, PT, 11/19/24, 1:33 PM EST

## 2024-11-22 ENCOUNTER — TELEPHONE (OUTPATIENT)
Age: 29
End: 2024-11-22

## 2024-11-22 NOTE — TELEPHONE ENCOUNTER
Caller: Earl Maciel    Relationship: Self       What was the call regarding: NO TRANSPORTATION

## 2024-11-27 ENCOUNTER — FLU SHOT (OUTPATIENT)
Dept: INTERNAL MEDICINE | Facility: CLINIC | Age: 29
End: 2024-11-27
Payer: COMMERCIAL

## 2024-11-27 DIAGNOSIS — Z23 NEED FOR INFLUENZA VACCINATION: Primary | ICD-10-CM

## 2024-11-27 PROCEDURE — 90656 IIV3 VACC NO PRSV 0.5 ML IM: CPT

## 2024-11-27 PROCEDURE — 90471 IMMUNIZATION ADMIN: CPT

## 2024-12-03 ENCOUNTER — TREATMENT (OUTPATIENT)
Age: 29
End: 2024-12-03
Payer: COMMERCIAL

## 2024-12-03 DIAGNOSIS — R29.898 IMPAIRED STRENGTH OF LOWER EXTREMITY: ICD-10-CM

## 2024-12-03 DIAGNOSIS — M25.652 IMPAIRED RANGE OF MOTION OF LEFT HIP: ICD-10-CM

## 2024-12-03 DIAGNOSIS — S32.425D CLOSED NONDISPLACED FRACTURE OF POSTERIOR WALL OF LEFT ACETABULUM WITH ROUTINE HEALING, SUBSEQUENT ENCOUNTER: Primary | ICD-10-CM

## 2024-12-03 DIAGNOSIS — R26.89 IMPAIRED GAIT AND MOBILITY: ICD-10-CM

## 2024-12-03 PROCEDURE — 97530 THERAPEUTIC ACTIVITIES: CPT | Performed by: PHYSICAL THERAPIST

## 2024-12-03 PROCEDURE — 97110 THERAPEUTIC EXERCISES: CPT | Performed by: PHYSICAL THERAPIST

## 2024-12-03 NOTE — PROGRESS NOTES
Physical Therapy Re-Assessment  [facility] Physical Therapy [place]   [address] [place] [address]  P: [phone]       F: [phone]        Patient: Earl Maciel   : 1995  Visit Diagnoses:     ICD-10-CM ICD-9-CM   1. Closed nondisplaced fracture of posterior wall of left acetabulum with routine healing, subsequent encounter  S32.425D V54.19   2. Impaired range of motion of left hip  M25.652 719.55   3. Impaired strength of lower extremity  R29.898 729.89   4. Impaired gait and mobility  R26.89 781.2     Referring practitioner: Staci Calderón,*  Date of Initial Visit: Type: THERAPY  Noted: 10/10/2024  Today's Date: 12/3/2024  Patient seen for 11 sessions      Subjective:   Earl Palominoarley reports:   Subjective Questionnaire: LEFS: 54/80  Clinical Progress: improved  Home Program Compliance: Yes  Treatment has included: therapeutic exercise, neuromuscular re-education, manual therapy, therapeutic activity, gait training, and cryotherapy    Subjective   Patient reports her hip has been feeling a lot better, not having any pain.  States at times she gets a sensation of pressure like her hip needs to pop but it does not actually pop.    Objective     Palpation   Left   Tenderness of the gluteus medius, piriformis and TFL.      Tenderness      Left Hip   Tenderness in the greater trochanter.     Active Range of Motion   Left Hip   Flexion: 90 degrees   Extension: 5 degrees   Abduction: 35 degrees   External rotation (90/90): 30 degrees   Internal rotation (90/90):  20 degrees        Strength/Myotome Testing      Left Hip   Flexion: 5  Extension: 5   Abduction: 5  Adduction: 4   External rotation: 5   Internal rotation: 5        Ambulation   Weight-Bearing Status   Weight-Bearing Status (Left): FWB  Assistive device used: none    Gait: WNL       See Exercise, Manual, and Modality Logs for complete treatment.     Assessment/Plan  Patient presents with improved left hip  range of motion and strength.  Her gait pattern has normalized without use of assistive device.  She continues to have some restricted muscle flexibility in the left hip/lower extremity.  She is tolerating progression of exercise program to dynamic stability/stabilization.  Will continue to progress as tolerated.  Progress toward previous goals: Partially Met    Goal Review  Short Term Goals (2 wks):  1.  Patient will have increased left hip flexion to 120 degrees.-progressing  2.  Patient will have increased left hip abduction to 30 degrees.-met  3.  Patient will have increased left hip external rotation to 40 degrees.-progressing  4.  Patient will demonstrate functional gait pattern using appropriate assistive device with an weightbearing restrictions.-met  5.  Patient will have successfully integrated posterior hip precautions and to ADL/IADL/work tasks.-met     Long Term Goals (4 weeks):  1.  Patient will be independent in performance of HEP for carryover upon discharge from skilled PT services.-progressing  2.  Patient will have normalized gait mechanics.-met  3.  Patient will have increased left hip strength to 4+/5.-met  4.  Patient will be able to perform ADLs/IADLs and work tasks without limitations.-ongoing  5.  Patient will be able to ascend and descend stairs with reciprocal pattern.-progressing      Recommendations: Continue as planned  Timeframe: 1 month  Prognosis to achieve goals: good    PT SIGNATURE: Romana Vargas PT     License Number: [id number]  Electronically signed by Romana Vargas, PT, 12/03/24, 1:31 PM EST          Timed:         Manual Therapy:         mins  09913;     Therapeutic Exercise:    20     mins  42636;     Neuromuscular Maribel:        mins  18411;    Therapeutic Activity:     15     mins  56047;     Gait Training:           mins  47669;     Ultrasound:          mins  28474;    Ionto                                  mins  32575  Self Care                            mins   67046  Canalith Repos         mins  94162  Orthotic MGMT/Train         mins  73042    Un-Timed:  Electrical Stimulation:         mins  51336 ( );  Dry Needling:          mins  53434 self-pay;  Dry Needling:          mins  32152 self-pay  Traction          mins  03329  Low Eval          mins  61441  Mod Eval          mins  56807  High Eval                            mins  09965    Timed Treatment:   35   mins   Total Treatment:     35   mins

## 2024-12-04 DIAGNOSIS — H10.9 BACTERIAL CONJUNCTIVITIS: Primary | ICD-10-CM

## 2024-12-04 RX ORDER — POLYMYXIN B SULFATE AND TRIMETHOPRIM 1; 10000 MG/ML; [USP'U]/ML
1 SOLUTION OPHTHALMIC EVERY 4 HOURS
Qty: 10 ML | Refills: 0 | Status: SHIPPED | OUTPATIENT
Start: 2024-12-04 | End: 2024-12-29

## 2024-12-09 DIAGNOSIS — Z23 NEED FOR INFLUENZA VACCINATION: Primary | ICD-10-CM

## 2024-12-09 DIAGNOSIS — H10.9 BACTERIAL CONJUNCTIVITIS: ICD-10-CM

## 2024-12-09 RX ORDER — AMOXICILLIN 875 MG/1
875 TABLET, COATED ORAL 2 TIMES DAILY
Qty: 10 TABLET | Refills: 0 | Status: SHIPPED | OUTPATIENT
Start: 2024-12-09 | End: 2024-12-09 | Stop reason: SDUPTHER

## 2024-12-09 RX ORDER — AMOXICILLIN 875 MG/1
875 TABLET, COATED ORAL 2 TIMES DAILY
Qty: 10 TABLET | Refills: 0 | Status: SHIPPED | OUTPATIENT
Start: 2024-12-09 | End: 2024-12-14

## 2024-12-10 ENCOUNTER — TREATMENT (OUTPATIENT)
Age: 29
End: 2024-12-10
Payer: COMMERCIAL

## 2024-12-10 DIAGNOSIS — M25.652 IMPAIRED RANGE OF MOTION OF LEFT HIP: ICD-10-CM

## 2024-12-10 DIAGNOSIS — S32.425D CLOSED NONDISPLACED FRACTURE OF POSTERIOR WALL OF LEFT ACETABULUM WITH ROUTINE HEALING, SUBSEQUENT ENCOUNTER: Primary | ICD-10-CM

## 2024-12-10 DIAGNOSIS — R29.898 IMPAIRED STRENGTH OF LOWER EXTREMITY: ICD-10-CM

## 2024-12-10 DIAGNOSIS — R26.89 IMPAIRED GAIT AND MOBILITY: ICD-10-CM

## 2024-12-10 PROCEDURE — 97112 NEUROMUSCULAR REEDUCATION: CPT | Performed by: PHYSICAL THERAPIST

## 2024-12-10 PROCEDURE — 97110 THERAPEUTIC EXERCISES: CPT | Performed by: PHYSICAL THERAPIST

## 2024-12-10 NOTE — PROGRESS NOTES
Physical Therapy Treatment Note  [facility] Physical Therapy [place]   [address] [place] [address]  P: [phone]       F: [phone]      Patient: Earl Maciel   : 1995  Treatment Diagnosis:     ICD-10-CM ICD-9-CM   1. Closed nondisplaced fracture of posterior wall of left acetabulum with routine healing, subsequent encounter  S32.425D V54.19   2. Impaired range of motion of left hip  M25.652 719.55   3. Impaired strength of lower extremity  R29.898 729.89   4. Impaired gait and mobility  R26.89 781.2     Referring practitioner: Staci Calderón,*  Date of Initial Visit: Type: THERAPY  Noted: 10/10/2024  Today's Date: 12/10/2024  Patient seen for 12 sessions           Subjective   Patient reports her hip is still feeling good.  States she missed last appointment due to having eye infection.    Objective     See Exercise, Manual, and Modality Logs for complete treatment.       Assessment/Plan  Patient performed program with progressed parameters and new exercises for strengthening, dynamic balance and stabilization.  She tolerated progression well with no adverse symptoms.  Benefits from cueing for technique and to prevent compensatory patterns.    Progress per Plan of Care and Progress strengthening /stabilization /functional activity           Timed:         Manual Therapy:         mins  57671;     Therapeutic Exercise:    30     mins  77039;    Neuromuscular Maribel:    15    mins  02961;    Therapeutic Activity:          mins  43120;     Gait Training:           mins  21589;     Ultrasound:          mins  53857;    Ionto                                  mins  39311  Self Care                            mins  90596  Canalith Repos         mins  11834  Orthotic MGMT/Train         mins  74934    Un-Timed:  Electrical Stimulation:         mins  04271 ( );  Dry Needling:          mins  71671 self-pay;  Dry Needling:          mins   self-pay  Traction           mins  63152  Group Therapy:          mins  48034;    Timed Treatment:   45   mins   Total Treatment:     45   mins        PT SIGNATURE: Romana Vargas PT     License Number: [id number]  Electronically signed by Romana Vargas PT, 12/10/24, 1:38 PM EST

## 2024-12-13 ENCOUNTER — TELEPHONE (OUTPATIENT)
Age: 29
End: 2024-12-13

## 2024-12-17 ENCOUNTER — TREATMENT (OUTPATIENT)
Age: 29
End: 2024-12-17
Payer: COMMERCIAL

## 2024-12-17 DIAGNOSIS — R29.898 IMPAIRED STRENGTH OF LOWER EXTREMITY: ICD-10-CM

## 2024-12-17 DIAGNOSIS — S32.425D CLOSED NONDISPLACED FRACTURE OF POSTERIOR WALL OF LEFT ACETABULUM WITH ROUTINE HEALING, SUBSEQUENT ENCOUNTER: Primary | ICD-10-CM

## 2024-12-17 DIAGNOSIS — M25.652 IMPAIRED RANGE OF MOTION OF LEFT HIP: ICD-10-CM

## 2024-12-17 DIAGNOSIS — R26.89 IMPAIRED GAIT AND MOBILITY: ICD-10-CM

## 2024-12-17 PROCEDURE — 97110 THERAPEUTIC EXERCISES: CPT | Performed by: PHYSICAL THERAPIST

## 2024-12-17 PROCEDURE — 97112 NEUROMUSCULAR REEDUCATION: CPT | Performed by: PHYSICAL THERAPIST

## 2024-12-17 NOTE — PROGRESS NOTES
Physical Therapy Treatment Note  [facility] Physical Therapy [place]   [address] [place] [address]  P: [phone]       F: [phone]      Patient: Earl Maciel   : 1995  Treatment Diagnosis:     ICD-10-CM ICD-9-CM   1. Closed nondisplaced fracture of posterior wall of left acetabulum with routine healing, subsequent encounter  S32.425D V54.19   2. Impaired range of motion of left hip  M25.652 719.55   3. Impaired strength of lower extremity  R29.898 729.89   4. Impaired gait and mobility  R26.89 781.2     Referring practitioner: Staci Calderón,*  Date of Initial Visit: Type: THERAPY  Noted: 10/10/2024  Today's Date: 2024  Patient seen for 13 sessions           Subjective   Patient reports she has been having a sensation of her left hip locking when she goes to stand up and start walking.  States she has had a couple instances where she is almost fallen because of it.  States she unlocks it by just taking her weight off of it moving her leg around.  States she feels pressure like it needs to pop but she has not actually felt or heard a pop sensation in her hip.    Objective     See Exercise, Manual, and Modality Logs for complete treatment.       Assessment/Plan  Patient performed program with progressed exercise parameters for strengthening and dynamic stabilization.  She tolerated progression well with no adverse symptoms.  Benefits from cueing to prevent compensatory patterns during exercises.  She did not have any instances of the catching in her hip during her exercise program this session.  Reviewed joint protection strategies.  Will continue to progress as tolerated.  Progress per Plan of Care and Progress strengthening /stabilization /functional activity           Timed:         Manual Therapy:         mins  81103;     Therapeutic Exercise:    30     mins  58652;    Neuromuscular Maribel:    10    mins  35723;    Therapeutic Activity:          mins   94000;     Gait Training:           mins  98790;     Ultrasound:          mins  26767;    Ionto                                  mins  98908  Self Care                            mins  10534  Canalith Repos         mins  09300  Orthotic MGMT/Train         mins  17675    Un-Timed:  Electrical Stimulation:         mins  00807 ( );  Dry Needling:          mins  56797 self-pay;  Dry Needling:          mins  85555 self-pay  Traction          mins  97057  Group Therapy:          mins  83743;    Timed Treatment:   40   mins   Total Treatment:     40   mins        PT SIGNATURE: Romana Vargas PT     License Number: [id number]  Electronically signed by Romana Vargas PT, 12/17/24, 1:35 PM EST

## 2024-12-23 ENCOUNTER — TREATMENT (OUTPATIENT)
Age: 29
End: 2024-12-23
Payer: COMMERCIAL

## 2024-12-23 DIAGNOSIS — M25.652 IMPAIRED RANGE OF MOTION OF LEFT HIP: ICD-10-CM

## 2024-12-23 DIAGNOSIS — R29.898 IMPAIRED STRENGTH OF LOWER EXTREMITY: ICD-10-CM

## 2024-12-23 DIAGNOSIS — S32.425D CLOSED NONDISPLACED FRACTURE OF POSTERIOR WALL OF LEFT ACETABULUM WITH ROUTINE HEALING, SUBSEQUENT ENCOUNTER: Primary | ICD-10-CM

## 2024-12-23 DIAGNOSIS — R26.89 IMPAIRED GAIT AND MOBILITY: ICD-10-CM

## 2024-12-23 PROCEDURE — 97110 THERAPEUTIC EXERCISES: CPT | Performed by: PHYSICAL THERAPIST

## 2024-12-23 PROCEDURE — 97530 THERAPEUTIC ACTIVITIES: CPT | Performed by: PHYSICAL THERAPIST

## 2024-12-23 NOTE — PROGRESS NOTES
Physical Therapy Re-Assessment & Discharge Summary  [facility] Physical Therapy [place]   [address] [place] [address]  P: [phone]       F: [phone]        Patient: Earl Maciel   : 1995  Visit Diagnoses:     ICD-10-CM ICD-9-CM   1. Closed nondisplaced fracture of posterior wall of left acetabulum with routine healing, subsequent encounter  S32.425D V54.19   2. Impaired range of motion of left hip  M25.652 719.55   3. Impaired strength of lower extremity  R29.898 729.89   4. Impaired gait and mobility  R26.89 781.2     Referring practitioner: Staci Calderón,*  Date of Initial Visit: Type: THERAPY  Noted: 10/10/2024  Today's Date: 2024  Patient seen for 14 sessions      Subjective:   Earl Maciel reports:   Subjective Questionnaire: LEFS: 47/80  Clinical Progress: improved  Home Program Compliance: Yes  Treatment has included: therapeutic exercise, neuromuscular re-education, manual therapy, therapeutic activity, and cryotherapy    Subjective   Patient reports she has not been having any pain in her hip although she has been having a lot of popping.  States she is doing everything she needs to do as far as her daily routine things and work things without any limitations.  States she is still on limitations from the doctor for her work schedule.  States she is able to go up and down stairs with hide any limitations.  Reports she feels confident doing her home exercise program independently.    Objective     Palpation   No TTP     Active Range of Motion   Left Hip   Flexion: 110 degrees   Extension: 12 degrees   Abduction: 35 degrees   External rotation (90/90): 40 degrees   Internal rotation (90/90):  32 degrees        Strength/Myotome Testing      Left Hip   Flexion: 5  Extension: 5   Abduction: 5  Adduction: 5   External rotation: 5   Internal rotation: 5      SLR:   Left 90 degrees      Ambulation   Weight-Bearing Status   Weight-Bearing Status  (Left): FWB  Assistive device used: none     Gait: WNL       See Exercise, Manual, and Modality Logs for complete treatment.     Assessment/Plan  Patient presents with improved left hip range of motion, strength and flexibility.  Her gait pattern is within normal limits and she is able to ascend and descend stairs with reciprocal pattern.  She is independent in her HEP and symptom management.  She understands signs and symptoms that indicate a need to return to MD.  Progress toward previous goals: All Met except hip flexion range of motion    Goal Review  Short Term Goals:  1.  Patient will have increased left hip flexion to 120 degrees.-improved  2.  Patient will have increased left hip abduction to 30 degrees.-met  3.  Patient will have increased left hip external rotation to 40 degrees.-met  4.  Patient will demonstrate functional gait pattern using appropriate assistive device with an weightbearing restrictions.-met  5.  Patient will have successfully integrated posterior hip precautions and to ADL/IADL/work tasks.-met     Long Term Goals:  1.  Patient will be independent in performance of HEP for carryover upon discharge from skilled PT services.-met  2.  Patient will have normalized gait mechanics.-met  3.  Patient will have increased left hip strength to 4+/5.-met  4.  Patient will be able to perform ADLs/IADLs and work tasks without limitations.-met  5.  Patient will be able to ascend and descend stairs with reciprocal pattern.-met      Recommendations: Discharge to [facility]    PT SIGNATURE: oRmana Vargas, PT     License Number: [id number]  Electronically signed by Romana Vargas, PT, 12/23/24, 1:39 PM EST          Timed:         Manual Therapy:         mins  82431;     Therapeutic Exercise:    8     mins  13480;     Neuromuscular Maribel:        mins  77884;    Therapeutic Activity:     15     mins  95686;     Gait Training:           mins  94857;     Ultrasound:          mins  22346;    Ionto                                   mins  63669  Self Care                            mins  59493  Canalith Repos         mins  58497  Orthotic MGMT/Train         mins  64298    Un-Timed:  Electrical Stimulation:         mins  81554 ( );  Dry Needling:          mins  39126 self-pay;  Dry Needling:          mins  25284 self-pay  Traction          mins  95300  Low Eval          mins  09967  Mod Eval          mins  07479  High Eval                            mins  73565    Timed Treatment:   23   mins   Total Treatment:     23   mins

## 2025-02-13 ENCOUNTER — OFFICE VISIT (OUTPATIENT)
Dept: OBSTETRICS AND GYNECOLOGY | Age: 30
End: 2025-02-13
Payer: COMMERCIAL

## 2025-02-13 VITALS
HEIGHT: 60 IN | BODY MASS INDEX: 41.43 KG/M2 | SYSTOLIC BLOOD PRESSURE: 108 MMHG | DIASTOLIC BLOOD PRESSURE: 60 MMHG | WEIGHT: 211 LBS

## 2025-02-13 DIAGNOSIS — R87.613 HSIL (HIGH GRADE SQUAMOUS INTRAEPITHELIAL LESION) ON PAP SMEAR OF CERVIX: Primary | ICD-10-CM

## 2025-02-13 DIAGNOSIS — N94.6 DYSMENORRHEA: ICD-10-CM

## 2025-02-13 DIAGNOSIS — Z13.0 SCREENING FOR IRON DEFICIENCY ANEMIA: ICD-10-CM

## 2025-02-13 DIAGNOSIS — N93.9 ABNORMAL UTERINE BLEEDING (AUB): ICD-10-CM

## 2025-02-13 PROCEDURE — 99214 OFFICE O/P EST MOD 30 MIN: CPT | Performed by: STUDENT IN AN ORGANIZED HEALTH CARE EDUCATION/TRAINING PROGRAM
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
## 2025-02-21 DIAGNOSIS — S72.002D CLOSED FRACTURE OF LEFT HIP WITH ROUTINE HEALING, SUBSEQUENT ENCOUNTER: ICD-10-CM

## 2025-02-21 RX ORDER — IBUPROFEN 800 MG/1
800 TABLET, FILM COATED ORAL EVERY 6 HOURS PRN
Qty: 30 TABLET | Refills: 1 | Status: SHIPPED | OUTPATIENT
Start: 2025-02-21

## 2025-03-18 ENCOUNTER — LAB (OUTPATIENT)
Facility: HOSPITAL | Age: 30
End: 2025-03-18
Payer: COMMERCIAL

## 2025-03-18 ENCOUNTER — DOCUMENTATION (OUTPATIENT)
Dept: INTERNAL MEDICINE | Facility: CLINIC | Age: 30
End: 2025-03-18
Payer: COMMERCIAL

## 2025-03-18 DIAGNOSIS — D50.9 IRON DEFICIENCY ANEMIA, UNSPECIFIED IRON DEFICIENCY ANEMIA TYPE: ICD-10-CM

## 2025-03-18 DIAGNOSIS — D50.9 IRON DEFICIENCY ANEMIA, UNSPECIFIED IRON DEFICIENCY ANEMIA TYPE: Primary | ICD-10-CM

## 2025-03-18 DIAGNOSIS — R53.83 FATIGUE, UNSPECIFIED TYPE: ICD-10-CM

## 2025-03-18 DIAGNOSIS — E05.00 GRAVES DISEASE: ICD-10-CM

## 2025-03-18 DIAGNOSIS — R73.09 ELEVATED GLUCOSE: ICD-10-CM

## 2025-03-18 DIAGNOSIS — R00.2 PALPITATIONS: ICD-10-CM

## 2025-03-18 LAB
ALBUMIN SERPL-MCNC: 4.1 G/DL (ref 3.5–5.2)
ALBUMIN/GLOB SERPL: 1.1 G/DL
ALP SERPL-CCNC: 78 U/L (ref 39–117)
ALT SERPL W P-5'-P-CCNC: 15 U/L (ref 1–33)
ANION GAP SERPL CALCULATED.3IONS-SCNC: 9 MMOL/L (ref 5–15)
AST SERPL-CCNC: 16 U/L (ref 1–32)
BASOPHILS # BLD AUTO: 0.05 10*3/MM3 (ref 0–0.2)
BASOPHILS NFR BLD AUTO: 0.8 % (ref 0–1.5)
BILIRUB SERPL-MCNC: 0.3 MG/DL (ref 0–1.2)
BUN SERPL-MCNC: 9 MG/DL (ref 6–20)
BUN/CREAT SERPL: 10.3 (ref 7–25)
CALCIUM SPEC-SCNC: 9 MG/DL (ref 8.6–10.5)
CHLORIDE SERPL-SCNC: 104 MMOL/L (ref 98–107)
CO2 SERPL-SCNC: 22 MMOL/L (ref 22–29)
CREAT SERPL-MCNC: 0.87 MG/DL (ref 0.57–1)
DEPRECATED RDW RBC AUTO: 43.9 FL (ref 37–54)
EGFRCR SERPLBLD CKD-EPI 2021: 93.2 ML/MIN/1.73
EOSINOPHIL # BLD AUTO: 0.19 10*3/MM3 (ref 0–0.4)
EOSINOPHIL NFR BLD AUTO: 3 % (ref 0.3–6.2)
ERYTHROCYTE [DISTWIDTH] IN BLOOD BY AUTOMATED COUNT: 16.9 % (ref 12.3–15.4)
FERRITIN SERPL-MCNC: 16.3 NG/ML (ref 13–150)
GLOBULIN UR ELPH-MCNC: 3.6 GM/DL
GLUCOSE SERPL-MCNC: 135 MG/DL (ref 65–99)
HCT VFR BLD AUTO: 31.6 % (ref 34–46.6)
HGB BLD-MCNC: 9.5 G/DL (ref 12–15.9)
IMM GRANULOCYTES # BLD AUTO: 0.02 10*3/MM3 (ref 0–0.05)
IMM GRANULOCYTES NFR BLD AUTO: 0.3 % (ref 0–0.5)
IRON 24H UR-MRATE: 35 MCG/DL (ref 37–145)
IRON SATN MFR SERPL: 8 % (ref 20–50)
LYMPHOCYTES # BLD AUTO: 2.29 10*3/MM3 (ref 0.7–3.1)
LYMPHOCYTES NFR BLD AUTO: 35.9 % (ref 19.6–45.3)
MCH RBC QN AUTO: 21.7 PG (ref 26.6–33)
MCHC RBC AUTO-ENTMCNC: 30.1 G/DL (ref 31.5–35.7)
MCV RBC AUTO: 72.3 FL (ref 79–97)
MONOCYTES # BLD AUTO: 0.66 10*3/MM3 (ref 0.1–0.9)
MONOCYTES NFR BLD AUTO: 10.3 % (ref 5–12)
NEUTROPHILS NFR BLD AUTO: 3.17 10*3/MM3 (ref 1.7–7)
NEUTROPHILS NFR BLD AUTO: 49.7 % (ref 42.7–76)
PLATELET # BLD AUTO: 451 10*3/MM3 (ref 140–450)
PMV BLD AUTO: 11.5 FL (ref 6–12)
POTASSIUM SERPL-SCNC: 3.5 MMOL/L (ref 3.5–5.2)
PROT SERPL-MCNC: 7.7 G/DL (ref 6–8.5)
RBC # BLD AUTO: 4.37 10*6/MM3 (ref 3.77–5.28)
SODIUM SERPL-SCNC: 135 MMOL/L (ref 136–145)
TIBC SERPL-MCNC: 420 MCG/DL (ref 298–536)
TRANSFERRIN SERPL-MCNC: 282 MG/DL (ref 200–360)
WBC NRBC COR # BLD AUTO: 6.38 10*3/MM3 (ref 3.4–10.8)

## 2025-03-18 PROCEDURE — 83540 ASSAY OF IRON: CPT

## 2025-03-18 PROCEDURE — 84481 FREE ASSAY (FT-3): CPT

## 2025-03-18 PROCEDURE — 82728 ASSAY OF FERRITIN: CPT

## 2025-03-18 PROCEDURE — 84439 ASSAY OF FREE THYROXINE: CPT

## 2025-03-18 PROCEDURE — 84466 ASSAY OF TRANSFERRIN: CPT

## 2025-03-18 PROCEDURE — 83036 HEMOGLOBIN GLYCOSYLATED A1C: CPT

## 2025-03-18 PROCEDURE — 36415 COLL VENOUS BLD VENIPUNCTURE: CPT

## 2025-03-18 PROCEDURE — 80050 GENERAL HEALTH PANEL: CPT

## 2025-03-19 ENCOUNTER — RESULTS FOLLOW-UP (OUTPATIENT)
Facility: HOSPITAL | Age: 30
End: 2025-03-19
Payer: COMMERCIAL

## 2025-03-19 ENCOUNTER — TELEPHONE (OUTPATIENT)
Dept: ONCOLOGY | Facility: CLINIC | Age: 30
End: 2025-03-19
Payer: COMMERCIAL

## 2025-03-19 DIAGNOSIS — E05.00 GRAVES DISEASE: ICD-10-CM

## 2025-03-19 DIAGNOSIS — R53.83 FATIGUE, UNSPECIFIED TYPE: Primary | ICD-10-CM

## 2025-03-19 DIAGNOSIS — R73.09 ELEVATED GLUCOSE: ICD-10-CM

## 2025-03-19 DIAGNOSIS — R00.2 PALPITATIONS: ICD-10-CM

## 2025-03-19 LAB
HBA1C MFR BLD: 6 % (ref 4.8–5.6)
T3FREE SERPL-MCNC: 2.22 PG/ML (ref 2–4.4)
T4 FREE SERPL-MCNC: 1.17 NG/DL (ref 0.92–1.68)
TSH SERPL DL<=0.05 MIU/L-ACNC: 2.42 UIU/ML (ref 0.27–4.2)

## 2025-03-19 NOTE — TELEPHONE ENCOUNTER
Patient regarding labs.  Educated patient on prediabetes.  I think patient's fatigue is likely related to her iron.  I have reach out to hematology regarding iron transfusions as patient has surgery in 2 weeks.  APRN at hematology will get patient on the schedule for an iron transfusion.  Patient notified.  Patient comfortable with the plan of care.

## 2025-03-19 NOTE — TELEPHONE ENCOUNTER
----- Message from Yajaira Moreira sent at 3/19/2025  9:19 AM EDT -----  Regarding: IV iron follow up  Hi! This patient just had labs done at her PCP office, she has iron deficiency anemia. She previously received IV venofer but for some reason did not complete all 4 doses. She has surgery scheduled in 2 weeks, could we please get her in to see an APN ASAP and enter in treatment plan for IV venofer weekly x4 doses? Thank you!Yajaira

## 2025-03-20 ENCOUNTER — TELEPHONE (OUTPATIENT)
Dept: INTERNAL MEDICINE | Facility: CLINIC | Age: 30
End: 2025-03-20
Payer: COMMERCIAL

## 2025-03-21 NOTE — PROGRESS NOTES
[facility]    CONSULTING IN BLOOD DISORDERS & CANCER      REASON FOR CONSULTATION/CHIEF COMPLAINT:     Evaluation and management for iron deficiency anemia                             REQUESTING PHYSICIAN: No ref. provider found  RECORDS OBTAINED:  Records of the patients history including those from the electronic medical record were reviewed and summarized in detail.    HISTORY OF PRESENT ILLNESS:    The patient is a 30 y.o. year old female is a pleasant 27-year-old -American female with medical history significant for iron deficiency anemia and postpartum hypothyroidism who had presented to her PCP with fatigue and mental fogginess and August 2022.  She was noted to have severe anemia with hemoglobin of 7.9 on the CBC from 8/31/2022.  WBC count of 3.1, hematocrit of 27.3, MCV 68 and platelet count of 455,000.  Additional work-up showed severe iron deficiency with iron level of 17 mcg/dL and ferritin 7 ng/ml.  Vitamin B12 and folate levels were within normal range.   Patient was referred to hematology for further evaluation and management.    Patient states she was first noted to have iron deficiency anemia during her pregnancy in 2015.  She had received iron infusions during that time.  Patient states she has always had heavy menstrual bleed.  Denies taking any oral contraceptive pills.      Denies any blood in stool or urine.  She does report of eating regular diet, however she is trying to not eat any meat products.  Denies any family history of sickle cell disease or any other hemoglobinopathies.  Denies alcohol, tobacco or drug abuse.  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    Given severe iron deficiency, patient received IV Venofer 300 mg x 4 doses in September - October 2022.    12/7/2022: Hemoglobin improved to 10.8.  2/29/2024: Hemoglobin dropped to 9.6.  Iron profile shows persistent iron deficiency with iron level of 39, ferritin 19.8 and iron saturation  10%.    INTERIM HISTORY:  She returns today for follow up on iron deficiency anemia. She last received IV iron on 3/7 and 3/21/2024. She completed labs on 3/18 demonstrating iron saturation 8%, hemoglobin 9.5, and ferritin 16.30. She notes extremely heavy menses and just had her period last week. She reports tachycardia, shortness of breath with activity, craving ice and also dial soap, and notes she occasionally will feel lightheaded and dizzy. She is scheduled for cervical conization on Monday 3/31/2025. Of note, she has also discussed with her OBGYN about potentially pursuing a hysterectomy.     PMH/PSH:  -History of iron deficiency  - Postpartum hypothyroidism  -History of syphilis    Past Medical History:   Diagnosis Date    Abnormal Pap smear of cervix     Anemia     HPV (human papilloma virus) infection     Urinary tract infection      Past Surgical History:   Procedure Laterality Date     SECTION      WISDOM TOOTH EXTRACTION         MEDICATIONS    Current Outpatient Medications:     ibuprofen (ADVIL,MOTRIN) 800 MG tablet, Take 1 tablet by mouth Every 6 (Six) Hours As Needed for Mild Pain or Moderate Pain., Disp: 30 tablet, Rfl: 1    methocarbamol (ROBAXIN) 500 MG tablet, Take 1 tablet by mouth 3 (Three) Times a Day., Disp: 60 tablet, Rfl: 0    omeprazole (priLOSEC) 20 MG capsule, Take 2 capsules by mouth Daily., Disp: 180 capsule, Rfl: 3    Semaglutide-Weight Management (Wegovy) 0.25 MG/0.5ML solution auto-injector, Inject 0.5 mL under the skin into the appropriate area as directed 1 (One) Time Per Week., Disp: 0.5 mL, Rfl: 2    ALLERGIES:   No Known Allergies    SOCIAL HISTORY:       Social History     Socioeconomic History    Marital status: Single   Tobacco Use    Smoking status: Never     Passive exposure: Never    Smokeless tobacco: Never   Vaping Use    Vaping status: Never Used   Substance and Sexual Activity    Alcohol use: Not Currently     Comment: occ    Drug use: Never    Sexual activity:  "Yes     Partners: Female     Birth control/protection: Partner of same sex         FAMILY HISTORY:  Family History   Problem Relation Age of Onset    No Known Problems Father     Anxiety disorder Mother     Asthma Mother     Thyroid disease Mother     Anxiety disorder Sister     Asthma Maternal Grandmother     Breast cancer Neg Hx     Ovarian cancer Neg Hx     Uterine cancer Neg Hx     Colon cancer Neg Hx             Vitals:    03/25/25 0810   BP: 124/86   Pulse: 80   Resp: 16   Temp: 98.2 °F (36.8 °C)   TempSrc: Oral   SpO2: 99%   Weight: 95.2 kg (209 lb 14.4 oz)   Height: 152.4 cm (60\")   PainSc: 0-No pain         3/25/2025     8:10 AM   Current Status   ECOG score 0      PHYSICAL EXAM:    CONSTITUTIONAL:  Vital signs reviewed. No distress, looks comfortable.  EYES:  Conjunctiva and lids unremarkable.   EARS,NOSE,MOUTH,THROAT:  Ears and nose appear unremarkable.    RESPIRATORY:  Normal respiratory effort.  Lungs clear to auscultation bilaterally.   CARDIOVASCULAR:  Normal S1 and S2.   GASTROINTESTINAL: Abdomen appears unremarkable.  Nondistended LYMPHATIC:  No cervical, supraclavicular lymphadenopathy.  NEURO: AAOx3, no focal deficits.  Appears to have equal strength all 4 extremities.  MUSCULOSKELETAL:  Unremarkable digits/nails.  No cyanosis or clubbing.  No apparent joint deformities.  SKIN: Warm. No rashes.   PSYCHIATRIC:  Normal judgment and insight. Normal mood and affect.     RECENT LABS:  Results from last 7 days   Lab Units 03/18/25  1455   WBC 10*3/mm3 6.38   NEUTROS ABS 10*3/mm3 3.17   HEMOGLOBIN g/dL 9.5*   HEMATOCRIT % 31.6*   PLATELETS 10*3/mm3 451*     Results from last 7 days   Lab Units 03/18/25  1455   SODIUM mmol/L 135*   POTASSIUM mmol/L 3.5   CHLORIDE mmol/L 104   CO2 mmol/L 22.0   BUN mg/dL 9   CREATININE mg/dL 0.87   CALCIUM mg/dL 9.0   ALBUMIN g/dL 4.1   BILIRUBIN mg/dL 0.3   ALK PHOS U/L 78   ALT (SGPT) U/L 15   AST (SGOT) U/L 16   GLUCOSE mg/dL 135*   FERRITIN ng/mL 16.30   IRON mcg/dL " 35*   TIBC mcg/dL 420             ASSESSMENT:   is a pleasant 29-year-old -American female with medical history significant for iron deficiency anemia and postpartum hypothyroidism who comes for iron deficiency anemia evaluation and management.    #Microcytic anemia:   Hemoglobin 7.9, hematocrit 27 & MCV 68.  Additional work-up showed severe deficiency with iron level of 17 & ferritin 7 ng/ml.  Normal vitamin B12 and folate.  Peripheral smear review performed by me in clinic shows microcytic RBCs, no increased schistocytes, no blasts or increased immature cells, adequate platelets.     Likely related to heavy menstrual bleed and subsequent deficiency.  Patient denies any blood loss in urine or stool.  Reports of eating adequate diet.  No family history of sickle cell disease or any other hemoglobinopathies.  Given severe iron deficiency anemia, patient received IV iron 300 mg x 4 doses in September - October 2022.   12/7/2022: Hemoglobin improved to 10.8 g/dL. Iron profile improved as well. Patient continues to have heavy menstrual bleed.  Patient was advised to continue taking oral iron supplementation and maintain adequate diet rich in iron.   March 2023: Hb 11.3. iron profile improved as well. Taking over-the-counter Heme Plex/liquid iron replacement.  Tolerating it well.  Also trying to eat more iron in her diet.  Menstrual cycles not as heavy.  Follows up with gynecology.  Follow-up in 3-4 months with labs  February 2024: Hemoglobin dropped to 9.6.  Iron profile shows persistent iron deficiency with iron level of 39, ferritin 19.8 and iron saturation 10%.  Given her symptoms along with poor tolerance to oral iron therapy, plan made to administer IV Venofer 300 mg weekly x 4 doses.  Will also attempt another formulation of oral iron with iron polysaccharide 130 mg twice daily.  Patient was advised to reach out to her gynecologist regarding heavy menstrual bleed.  Follow-up in 1 year or sooner  if needed.  Patient works with [employer] and was advised to call us sooner if symptoms do not improve or worsen.  3/28/2025: Hemoglobin 9.5, iron profile 8%, and ferritin 16.30. She remains symptomatic and continues to experience heavy menses. Discussed closer follow up to continue to replete iron as needed. Proceed with IV venofer weekly x4 doses.     #Thrombocytosis: Likely ROSA related.  Resolved with iron replacement    #Heavy menstrual bleed: Was seen by OB/GYN in December 2022.  Patient is being evaluated for copper IUD treatment.  Advised close follow-up with OB/GYN.    PLAN:   -Proceed with IV venofer 300mg x4 doses  -Surgery scheduled 3/31/2025  -Return to clinic in 3 months for MD or NP visit, cbc, iron profile, and ferritin     SIA Mart

## 2025-03-24 ENCOUNTER — OFFICE VISIT (OUTPATIENT)
Dept: INTERNAL MEDICINE | Facility: CLINIC | Age: 30
End: 2025-03-24
Payer: COMMERCIAL

## 2025-03-24 VITALS
OXYGEN SATURATION: 98 % | HEART RATE: 93 BPM | WEIGHT: 209 LBS | HEIGHT: 60 IN | DIASTOLIC BLOOD PRESSURE: 90 MMHG | TEMPERATURE: 98.6 F | BODY MASS INDEX: 41.03 KG/M2 | SYSTOLIC BLOOD PRESSURE: 122 MMHG

## 2025-03-24 DIAGNOSIS — R19.02 ABDOMINAL MASS, LUQ (LEFT UPPER QUADRANT): ICD-10-CM

## 2025-03-24 DIAGNOSIS — R10.32 LLQ PAIN: Primary | ICD-10-CM

## 2025-03-24 PROCEDURE — 99214 OFFICE O/P EST MOD 30 MIN: CPT

## 2025-03-24 NOTE — PATIENT INSTRUCTIONS
"\"Brown Cow\" Regimen:     Mix all ingredients together and take at home one time for constipation relief:    One dose of Milk of Mag  One dose of Miralax  One cup of warm prune juice    Daily Miralax for the next week and taper off as needed until regular BM   "

## 2025-03-24 NOTE — PROGRESS NOTES
Chief Complaint  Abdominal Pain     Subjective:      History of Present Illness {  Problem List  Visit  Diagnosis   Encounters  Notes  Medications  Labs  Result Review Imaging  Media :23}     Earl Maciel presents to [facility] for:              The patient presents for evaluation of abdominal pain.    She reports persistent bilateral abdominal discomfort, described as a knot-like sensation, which has been ongoing for the past 3 weeks. She experiences daily bowel movements, which is unusual for her, with the stool consistency being soft. Despite no dietary changes, she has been unable to consume a full meal for the past 2 weeks due to early satiety and bloating. She does not experience nausea or vomiting but reports frequent flatulence. She has attempted self-medication with Gas-X, which provided some relief. She also reports severe pain on the left side of her abdomen. She retains her gallbladder and does not have any urinary issues. She expresses interest in undergoing a food allergy panel. She has no history of tick bites and reports no foul odor from her stools. She occasionally takes Prilosec, which provides some relief, but not for her current symptoms. She also reports rib and back pain, which she attributes to stress. She has a known allergy to AVOCADO, which causes her to break out in hives. Consumption of red meat exacerbates her symptoms.    FAMILY HISTORY  She does not have a family history of colon cancer.    ALLERGIES  She is allergic to AVOCADO.    MEDICATIONS  Current: Prilosec (omeprazole)         I have reviewed patient's medical history, any new submitted information provided by patient or medical assistant and updated medical record.      Objective:      Physical Exam  Vitals reviewed.   Constitutional:       Appearance: Normal appearance.   HENT:      Head: Normocephalic.   Cardiovascular:      Rate and Rhythm: Normal rate and regular  "rhythm.      Pulses: Normal pulses.      Heart sounds: Normal heart sounds.   Pulmonary:      Effort: Pulmonary effort is normal.      Breath sounds: Normal breath sounds.   Abdominal:      General: Abdomen is flat. Bowel sounds are normal. There is no distension.      Palpations: Abdomen is soft. There is mass (LUQ).      Tenderness: There is abdominal tenderness (LLQ). There is no right CVA tenderness or left CVA tenderness.   Skin:     General: Skin is warm and dry.      Capillary Refill: Capillary refill takes less than 2 seconds.   Neurological:      General: No focal deficit present.      Mental Status: She is alert.   Psychiatric:         Mood and Affect: Mood normal.         Behavior: Behavior normal.      Result Review  Data Reviewed:{ Labs  Result Review  Imaging  Med Tab  Media :23}     Results                    Vital Signs:   /90 (BP Location: Left arm, Patient Position: Sitting, Cuff Size: Large Adult)   Pulse 93   Temp 98.6 °F (37 °C) (Oral)   Ht 152.4 cm (60\")   Wt 94.8 kg (209 lb)   SpO2 98%   BMI 40.82 kg/m²                 Requested Prescriptions      No prescriptions requested or ordered in this encounter       Routine medications provided by this office will also be refilled via pharmacy request.       Current Outpatient Medications:   •  ibuprofen (ADVIL,MOTRIN) 800 MG tablet, Take 1 tablet by mouth Every 6 (Six) Hours As Needed for Mild Pain or Moderate Pain., Disp: 30 tablet, Rfl: 1  •  methocarbamol (ROBAXIN) 500 MG tablet, Take 1 tablet by mouth 3 (Three) Times a Day., Disp: 60 tablet, Rfl: 0  •  omeprazole (priLOSEC) 20 MG capsule, Take 2 capsules by mouth Daily., Disp: 180 capsule, Rfl: 3     Assessment and Plan:      Assessment and Plan {CC Problem List  Visit Diagnosis  ROS  Review (Popup)  [facility] Maintenance  Quality  BestPractice  Medications  SmartSets  SnapShot Encounters  Media :23}     Problem List Items Addressed This Visit    None  Visit Diagnoses  " "       LLQ pain    -  Primary    Relevant Orders    CT Abdomen Pelvis With & Without Contrast      Abdominal mass, LUQ (left upper quadrant)        Relevant Orders    CT Abdomen Pelvis With & Without Contrast               1. Abdominal pain.  The differential diagnosis includes hernia, colitis, or an ovarian-related issue. A CT scan of the abdomen will be ordered to further investigate the cause of the pain. She is advised to contact the scheduling department to arrange the scan. In the interim, she is recommended to use suppositories and consume a mixture of 1 cup of warm prune juice, 1 cap of MiraLAX, and 1 dose of milk of magnesia, to be consumed rapidly. If this regimen results in multiple bowel movements within a day, she can continue with just MiraLAX. If the results are not significant, she should continue this regimen for 3 days, supplemented by the use of suppositories. If the CT scan does not reveal any significant findings, a food allergy profile may be considered.     Patient verbalizes understanding and is comfortable with the plan of care.      Follow Up {Instructions Charge Capture  Follow-up Communications :23}     No follow-ups on file.      Patient was given instructions and counseling regarding her condition or for health maintenance advice. Please see specific information pulled into the AVS if appropriate.    Dragon disclaimer:   Much of this encounter note is an electronic transcription/translation of spoken language to printed text. The electronic translation of spoken language may permit erroneous, or at times, nonsensical words or phrases to be inadvertently transcribed; Although I have reviewed the note for such errors, some may still exist.         Additional Patient Counseling:       Patient Instructions   \"Brown Cow\" Regimen:     Mix all ingredients together and take at home one time for constipation relief:    One dose of Milk of Mag  One dose of Miralax  One cup of warm prune " juice    Daily Miralax for the next week and taper off as needed until regular BM     Patient or patient representative verbalized consent for the use of Ambient Listening during the visit with  SIA Jackson for chart documentation. 3/24/2025  10:32 EDT

## 2025-03-25 ENCOUNTER — INFUSION (OUTPATIENT)
Dept: ONCOLOGY | Facility: HOSPITAL | Age: 30
End: 2025-03-25
Payer: COMMERCIAL

## 2025-03-25 ENCOUNTER — OFFICE VISIT (OUTPATIENT)
Dept: ONCOLOGY | Facility: CLINIC | Age: 30
End: 2025-03-25
Payer: COMMERCIAL

## 2025-03-25 VITALS
HEART RATE: 80 BPM | WEIGHT: 209.9 LBS | TEMPERATURE: 98.2 F | BODY MASS INDEX: 41.21 KG/M2 | SYSTOLIC BLOOD PRESSURE: 124 MMHG | RESPIRATION RATE: 16 BRPM | OXYGEN SATURATION: 99 % | HEIGHT: 60 IN | DIASTOLIC BLOOD PRESSURE: 86 MMHG

## 2025-03-25 DIAGNOSIS — K90.9 IRON MALABSORPTION: ICD-10-CM

## 2025-03-25 DIAGNOSIS — D50.0 IRON DEFICIENCY ANEMIA DUE TO CHRONIC BLOOD LOSS: Primary | ICD-10-CM

## 2025-03-25 PROCEDURE — 96365 THER/PROPH/DIAG IV INF INIT: CPT

## 2025-03-25 PROCEDURE — 96375 TX/PRO/DX INJ NEW DRUG ADDON: CPT

## 2025-03-25 PROCEDURE — 25010000002 IRON SUCROSE PER 1 MG: Performed by: INTERNAL MEDICINE

## 2025-03-25 PROCEDURE — 63710000001 DIPHENHYDRAMINE PER 50 MG: Performed by: INTERNAL MEDICINE

## 2025-03-25 PROCEDURE — 25810000003 SODIUM CHLORIDE 0.9 % SOLUTION 250 ML FLEX CONT: Performed by: INTERNAL MEDICINE

## 2025-03-25 RX ORDER — HYDROCORTISONE SODIUM SUCCINATE 100 MG/2ML
100 INJECTION INTRAMUSCULAR; INTRAVENOUS AS NEEDED
Status: CANCELLED | OUTPATIENT
Start: 2025-03-25

## 2025-03-25 RX ORDER — DIPHENHYDRAMINE HYDROCHLORIDE 50 MG/ML
50 INJECTION, SOLUTION INTRAMUSCULAR; INTRAVENOUS AS NEEDED
Status: CANCELLED | OUTPATIENT
Start: 2025-03-25

## 2025-03-25 RX ORDER — FAMOTIDINE 10 MG/ML
20 INJECTION, SOLUTION INTRAVENOUS AS NEEDED
Status: CANCELLED | OUTPATIENT
Start: 2025-03-25

## 2025-03-25 RX ORDER — SODIUM CHLORIDE 9 MG/ML
20 INJECTION, SOLUTION INTRAVENOUS ONCE
Status: CANCELLED | OUTPATIENT
Start: 2025-03-25

## 2025-03-25 RX ORDER — ACETAMINOPHEN 325 MG/1
650 TABLET ORAL ONCE
Status: COMPLETED | OUTPATIENT
Start: 2025-03-25 | End: 2025-03-25

## 2025-03-25 RX ORDER — FAMOTIDINE 10 MG/ML
20 INJECTION, SOLUTION INTRAVENOUS ONCE
Status: COMPLETED | OUTPATIENT
Start: 2025-03-25 | End: 2025-03-25

## 2025-03-25 RX ORDER — DIPHENHYDRAMINE HCL 25 MG
25 CAPSULE ORAL ONCE
Status: COMPLETED | OUTPATIENT
Start: 2025-03-25 | End: 2025-03-25

## 2025-03-25 RX ADMIN — ACETAMINOPHEN 650 MG: 325 TABLET ORAL at 08:30

## 2025-03-25 RX ADMIN — IRON SUCROSE 300 MG: 20 INJECTION, SOLUTION INTRAVENOUS at 09:02

## 2025-03-25 RX ADMIN — DIPHENHYDRAMINE HYDROCHLORIDE 25 MG: 25 CAPSULE ORAL at 08:30

## 2025-03-25 RX ADMIN — FAMOTIDINE 20 MG: 10 INJECTION INTRAVENOUS at 08:51

## 2025-03-25 NOTE — NURSING NOTE
Pt is nervous about infusion today due to problems in the past. Back in 2022 pt received pepcid 20 mg IV with her premeds. RN asked Yajaira STOVALL if we could add 20 mg of pepcid to pts pre meds, per Yajaira this is okay. Pt tolerated infusion today with no issues. In basket was sent to Susie to add pepcid to future treatments.

## 2025-03-26 ENCOUNTER — PATIENT MESSAGE (OUTPATIENT)
Dept: OBSTETRICS AND GYNECOLOGY | Age: 30
End: 2025-03-26
Payer: COMMERCIAL

## 2025-03-31 ENCOUNTER — ANESTHESIA (OUTPATIENT)
Dept: PERIOP | Facility: HOSPITAL | Age: 30
End: 2025-03-31
Payer: COMMERCIAL

## 2025-03-31 ENCOUNTER — ANESTHESIA EVENT (OUTPATIENT)
Dept: PERIOP | Facility: HOSPITAL | Age: 30
End: 2025-03-31
Payer: COMMERCIAL

## 2025-03-31 ENCOUNTER — HOSPITAL ENCOUNTER (OUTPATIENT)
Facility: HOSPITAL | Age: 30
Discharge: HOME | End: 2025-03-31
Attending: STUDENT IN AN ORGANIZED HEALTH CARE EDUCATION/TRAINING PROGRAM | Admitting: STUDENT IN AN ORGANIZED HEALTH CARE EDUCATION/TRAINING PROGRAM
Payer: COMMERCIAL

## 2025-03-31 VITALS
OXYGEN SATURATION: 98 % | DIASTOLIC BLOOD PRESSURE: 83 MMHG | RESPIRATION RATE: 16 BRPM | TEMPERATURE: 98.8 F | HEART RATE: 70 BPM | SYSTOLIC BLOOD PRESSURE: 129 MMHG

## 2025-03-31 DIAGNOSIS — R87.613 HSIL (HIGH GRADE SQUAMOUS INTRAEPITHELIAL LESION) ON PAP SMEAR OF CERVIX: ICD-10-CM

## 2025-03-31 PROCEDURE — 25010000002 FENTANYL CITRATE (PF) 50 MCG/ML SOLUTION

## 2025-03-31 PROCEDURE — 25810000003 LACTATED RINGERS PER 1000 ML: Performed by: ANESTHESIOLOGY

## 2025-03-31 PROCEDURE — 57520 CONIZATION OF CERVIX: CPT | Performed by: STUDENT IN AN ORGANIZED HEALTH CARE EDUCATION/TRAINING PROGRAM

## 2025-03-31 PROCEDURE — 25010000002 LIDOCAINE 2% SOLUTION

## 2025-03-31 PROCEDURE — 25010000002 DROPERIDOL PER 5 MG

## 2025-03-31 PROCEDURE — 25010000002 MIDAZOLAM PER 1 MG: Performed by: ANESTHESIOLOGY

## 2025-03-31 PROCEDURE — 25010000002 KETOROLAC TROMETHAMINE PER 15 MG

## 2025-03-31 PROCEDURE — 25010000002 DEXAMETHASONE PER 1 MG

## 2025-03-31 PROCEDURE — 25010000002 PROPOFOL 10 MG/ML EMULSION

## 2025-03-31 PROCEDURE — 88342 IMHCHEM/IMCYTCHM 1ST ANTB: CPT | Performed by: STUDENT IN AN ORGANIZED HEALTH CARE EDUCATION/TRAINING PROGRAM

## 2025-03-31 PROCEDURE — 25010000002 ONDANSETRON PER 1 MG

## 2025-03-31 PROCEDURE — 25010000002 VASOPRESSIN 20 UNIT/ML SOLUTION: Performed by: STUDENT IN AN ORGANIZED HEALTH CARE EDUCATION/TRAINING PROGRAM

## 2025-03-31 PROCEDURE — 88305 TISSUE EXAM BY PATHOLOGIST: CPT | Performed by: STUDENT IN AN ORGANIZED HEALTH CARE EDUCATION/TRAINING PROGRAM

## 2025-03-31 PROCEDURE — S0260 H&P FOR SURGERY: HCPCS | Performed by: STUDENT IN AN ORGANIZED HEALTH CARE EDUCATION/TRAINING PROGRAM

## 2025-03-31 PROCEDURE — 88307 TISSUE EXAM BY PATHOLOGIST: CPT | Performed by: STUDENT IN AN ORGANIZED HEALTH CARE EDUCATION/TRAINING PROGRAM

## 2025-03-31 RX ORDER — PROPOFOL 10 MG/ML
VIAL (ML) INTRAVENOUS AS NEEDED
Status: DISCONTINUED | OUTPATIENT
Start: 2025-03-31 | End: 2025-03-31 | Stop reason: SURG

## 2025-03-31 RX ORDER — EPHEDRINE SULFATE 50 MG/ML
5 INJECTION, SOLUTION INTRAVENOUS ONCE AS NEEDED
Status: DISCONTINUED | OUTPATIENT
Start: 2025-03-31 | End: 2025-03-31 | Stop reason: HOSPADM

## 2025-03-31 RX ORDER — ONDANSETRON 2 MG/ML
INJECTION INTRAMUSCULAR; INTRAVENOUS AS NEEDED
Status: DISCONTINUED | OUTPATIENT
Start: 2025-03-31 | End: 2025-03-31 | Stop reason: SURG

## 2025-03-31 RX ORDER — FENTANYL CITRATE 50 UG/ML
INJECTION, SOLUTION INTRAMUSCULAR; INTRAVENOUS AS NEEDED
Status: DISCONTINUED | OUTPATIENT
Start: 2025-03-31 | End: 2025-03-31 | Stop reason: SURG

## 2025-03-31 RX ORDER — FLUMAZENIL 0.1 MG/ML
0.2 INJECTION INTRAVENOUS AS NEEDED
Status: DISCONTINUED | OUTPATIENT
Start: 2025-03-31 | End: 2025-03-31 | Stop reason: HOSPADM

## 2025-03-31 RX ORDER — NALOXONE HCL 0.4 MG/ML
0.2 VIAL (ML) INJECTION AS NEEDED
Status: DISCONTINUED | OUTPATIENT
Start: 2025-03-31 | End: 2025-03-31 | Stop reason: HOSPADM

## 2025-03-31 RX ORDER — DEXAMETHASONE SODIUM PHOSPHATE 4 MG/ML
INJECTION, SOLUTION INTRA-ARTICULAR; INTRALESIONAL; INTRAMUSCULAR; INTRAVENOUS; SOFT TISSUE AS NEEDED
Status: DISCONTINUED | OUTPATIENT
Start: 2025-03-31 | End: 2025-03-31 | Stop reason: SURG

## 2025-03-31 RX ORDER — SODIUM CHLORIDE 0.9 % (FLUSH) 0.9 %
3 SYRINGE (ML) INJECTION EVERY 12 HOURS SCHEDULED
Status: DISCONTINUED | OUTPATIENT
Start: 2025-03-31 | End: 2025-03-31 | Stop reason: HOSPADM

## 2025-03-31 RX ORDER — FENTANYL CITRATE 50 UG/ML
50 INJECTION, SOLUTION INTRAMUSCULAR; INTRAVENOUS ONCE AS NEEDED
Status: DISCONTINUED | OUTPATIENT
Start: 2025-03-31 | End: 2025-03-31 | Stop reason: HOSPADM

## 2025-03-31 RX ORDER — FAMOTIDINE 10 MG/ML
20 INJECTION, SOLUTION INTRAVENOUS ONCE
Status: COMPLETED | OUTPATIENT
Start: 2025-03-31 | End: 2025-03-31

## 2025-03-31 RX ORDER — ATROPINE SULFATE 0.4 MG/ML
0.4 INJECTION, SOLUTION INTRAMUSCULAR; INTRAVENOUS; SUBCUTANEOUS ONCE AS NEEDED
Status: DISCONTINUED | OUTPATIENT
Start: 2025-03-31 | End: 2025-03-31 | Stop reason: HOSPADM

## 2025-03-31 RX ORDER — ONDANSETRON 2 MG/ML
4 INJECTION INTRAMUSCULAR; INTRAVENOUS ONCE AS NEEDED
Status: DISCONTINUED | OUTPATIENT
Start: 2025-03-31 | End: 2025-03-31 | Stop reason: HOSPADM

## 2025-03-31 RX ORDER — HYDROCODONE BITARTRATE AND ACETAMINOPHEN 5; 325 MG/1; MG/1
1 TABLET ORAL ONCE AS NEEDED
Status: DISCONTINUED | OUTPATIENT
Start: 2025-03-31 | End: 2025-03-31 | Stop reason: HOSPADM

## 2025-03-31 RX ORDER — SODIUM CHLORIDE 0.9 % (FLUSH) 0.9 %
3-10 SYRINGE (ML) INJECTION AS NEEDED
Status: DISCONTINUED | OUTPATIENT
Start: 2025-03-31 | End: 2025-03-31 | Stop reason: HOSPADM

## 2025-03-31 RX ORDER — MIDAZOLAM HYDROCHLORIDE 1 MG/ML
1 INJECTION, SOLUTION INTRAMUSCULAR; INTRAVENOUS
Status: DISCONTINUED | OUTPATIENT
Start: 2025-03-31 | End: 2025-03-31 | Stop reason: HOSPADM

## 2025-03-31 RX ORDER — IBUPROFEN 600 MG/1
600 TABLET, FILM COATED ORAL EVERY 6 HOURS
Qty: 60 TABLET | Refills: 1 | Status: SHIPPED | OUTPATIENT
Start: 2025-03-31

## 2025-03-31 RX ORDER — IPRATROPIUM BROMIDE AND ALBUTEROL SULFATE 2.5; .5 MG/3ML; MG/3ML
3 SOLUTION RESPIRATORY (INHALATION) ONCE AS NEEDED
Status: DISCONTINUED | OUTPATIENT
Start: 2025-03-31 | End: 2025-03-31 | Stop reason: HOSPADM

## 2025-03-31 RX ORDER — HYDRALAZINE HYDROCHLORIDE 20 MG/ML
5 INJECTION INTRAMUSCULAR; INTRAVENOUS
Status: DISCONTINUED | OUTPATIENT
Start: 2025-03-31 | End: 2025-03-31 | Stop reason: HOSPADM

## 2025-03-31 RX ORDER — DROPERIDOL 2.5 MG/ML
INJECTION, SOLUTION INTRAMUSCULAR; INTRAVENOUS AS NEEDED
Status: DISCONTINUED | OUTPATIENT
Start: 2025-03-31 | End: 2025-03-31 | Stop reason: SURG

## 2025-03-31 RX ORDER — DROPERIDOL 2.5 MG/ML
0.62 INJECTION, SOLUTION INTRAMUSCULAR; INTRAVENOUS
Status: DISCONTINUED | OUTPATIENT
Start: 2025-03-31 | End: 2025-03-31 | Stop reason: HOSPADM

## 2025-03-31 RX ORDER — OXYCODONE AND ACETAMINOPHEN 7.5; 325 MG/1; MG/1
1 TABLET ORAL EVERY 4 HOURS PRN
Status: DISCONTINUED | OUTPATIENT
Start: 2025-03-31 | End: 2025-03-31 | Stop reason: HOSPADM

## 2025-03-31 RX ORDER — LIDOCAINE HYDROCHLORIDE 10 MG/ML
0.5 INJECTION, SOLUTION INFILTRATION; PERINEURAL ONCE AS NEEDED
Status: DISCONTINUED | OUTPATIENT
Start: 2025-03-31 | End: 2025-03-31 | Stop reason: HOSPADM

## 2025-03-31 RX ORDER — IBUPROFEN 800 MG/1
800 TABLET, FILM COATED ORAL EVERY 8 HOURS SCHEDULED
Status: DISCONTINUED | OUTPATIENT
Start: 2025-03-31 | End: 2025-03-31 | Stop reason: HOSPADM

## 2025-03-31 RX ORDER — PROMETHAZINE HYDROCHLORIDE 25 MG/1
25 SUPPOSITORY RECTAL ONCE AS NEEDED
Status: DISCONTINUED | OUTPATIENT
Start: 2025-03-31 | End: 2025-03-31 | Stop reason: HOSPADM

## 2025-03-31 RX ORDER — KETOROLAC TROMETHAMINE 30 MG/ML
INJECTION, SOLUTION INTRAMUSCULAR; INTRAVENOUS AS NEEDED
Status: DISCONTINUED | OUTPATIENT
Start: 2025-03-31 | End: 2025-03-31 | Stop reason: SURG

## 2025-03-31 RX ORDER — FENTANYL CITRATE 50 UG/ML
50 INJECTION, SOLUTION INTRAMUSCULAR; INTRAVENOUS
Status: DISCONTINUED | OUTPATIENT
Start: 2025-03-31 | End: 2025-03-31 | Stop reason: HOSPADM

## 2025-03-31 RX ORDER — PROMETHAZINE HYDROCHLORIDE 25 MG/1
25 TABLET ORAL ONCE AS NEEDED
Status: DISCONTINUED | OUTPATIENT
Start: 2025-03-31 | End: 2025-03-31 | Stop reason: HOSPADM

## 2025-03-31 RX ORDER — ACETAMINOPHEN 325 MG/1
650 TABLET ORAL EVERY 6 HOURS
Qty: 60 TABLET | Refills: 1 | Status: SHIPPED | OUTPATIENT
Start: 2025-03-31

## 2025-03-31 RX ORDER — LIDOCAINE HYDROCHLORIDE 20 MG/ML
INJECTION, SOLUTION INFILTRATION; PERINEURAL AS NEEDED
Status: DISCONTINUED | OUTPATIENT
Start: 2025-03-31 | End: 2025-03-31 | Stop reason: SURG

## 2025-03-31 RX ORDER — LABETALOL HYDROCHLORIDE 5 MG/ML
5 INJECTION, SOLUTION INTRAVENOUS
Status: DISCONTINUED | OUTPATIENT
Start: 2025-03-31 | End: 2025-03-31 | Stop reason: HOSPADM

## 2025-03-31 RX ORDER — DIPHENHYDRAMINE HYDROCHLORIDE 50 MG/ML
12.5 INJECTION, SOLUTION INTRAMUSCULAR; INTRAVENOUS
Status: DISCONTINUED | OUTPATIENT
Start: 2025-03-31 | End: 2025-03-31 | Stop reason: HOSPADM

## 2025-03-31 RX ORDER — VASOPRESSIN 20 [USP'U]/ML
INJECTION, SOLUTION INTRAVENOUS AS NEEDED
Status: DISCONTINUED | OUTPATIENT
Start: 2025-03-31 | End: 2025-03-31 | Stop reason: HOSPADM

## 2025-03-31 RX ORDER — SODIUM CHLORIDE, SODIUM LACTATE, POTASSIUM CHLORIDE, CALCIUM CHLORIDE 600; 310; 30; 20 MG/100ML; MG/100ML; MG/100ML; MG/100ML
9 INJECTION, SOLUTION INTRAVENOUS CONTINUOUS
Status: DISCONTINUED | OUTPATIENT
Start: 2025-03-31 | End: 2025-03-31 | Stop reason: HOSPADM

## 2025-03-31 RX ORDER — HYDROMORPHONE HYDROCHLORIDE 1 MG/ML
0.5 INJECTION, SOLUTION INTRAMUSCULAR; INTRAVENOUS; SUBCUTANEOUS
Status: DISCONTINUED | OUTPATIENT
Start: 2025-03-31 | End: 2025-03-31 | Stop reason: HOSPADM

## 2025-03-31 RX ADMIN — LIDOCAINE HYDROCHLORIDE 100 MG: 20 INJECTION, SOLUTION INFILTRATION; PERINEURAL at 11:18

## 2025-03-31 RX ADMIN — MIDAZOLAM HYDROCHLORIDE 1 MG: 1 INJECTION, SOLUTION INTRAMUSCULAR; INTRAVENOUS at 10:38

## 2025-03-31 RX ADMIN — KETOROLAC TROMETHAMINE 30 MG: 30 INJECTION, SOLUTION INTRAMUSCULAR at 12:01

## 2025-03-31 RX ADMIN — SODIUM CHLORIDE, POTASSIUM CHLORIDE, SODIUM LACTATE AND CALCIUM CHLORIDE 9 ML/HR: 600; 310; 30; 20 INJECTION, SOLUTION INTRAVENOUS at 10:31

## 2025-03-31 RX ADMIN — FENTANYL CITRATE 50 MCG: 50 INJECTION, SOLUTION INTRAMUSCULAR; INTRAVENOUS at 11:23

## 2025-03-31 RX ADMIN — DEXAMETHASONE SODIUM PHOSPHATE 4 MG: 4 INJECTION, SOLUTION INTRA-ARTICULAR; INTRALESIONAL; INTRAMUSCULAR; INTRAVENOUS; SOFT TISSUE at 11:27

## 2025-03-31 RX ADMIN — DROPERIDOL 0.62 MG: 2.5 INJECTION, SOLUTION INTRAMUSCULAR; INTRAVENOUS at 12:12

## 2025-03-31 RX ADMIN — FAMOTIDINE 20 MG: 10 INJECTION INTRAVENOUS at 10:28

## 2025-03-31 RX ADMIN — ONDANSETRON 4 MG: 2 INJECTION, SOLUTION INTRAMUSCULAR; INTRAVENOUS at 11:24

## 2025-03-31 RX ADMIN — PROPOFOL 100 MG: 10 INJECTION, EMULSION INTRAVENOUS at 11:21

## 2025-03-31 RX ADMIN — PROPOFOL 200 MG: 10 INJECTION, EMULSION INTRAVENOUS at 11:18

## 2025-03-31 NOTE — OP NOTE
CERVICAL CONIZATION  Procedure Report    Patient Name:  Earl Maciel  YOB: 1995    Date of Surgery:  3/31/2025     Indications:  HSIL on cervical biopsy    Pre-op Diagnosis:   HSIL (high grade squamous intraepithelial lesion) on Pap smear of cervix [R87.613]    Post-Op Diagnosis Codes:     * HSIL (high grade squamous intraepithelial lesion) on Pap smear of cervix [R87.613]     Procedure(s):  CERVICAL CONIZATION         Staff:  Surgeon(s):  Malinda Conway MD         Anesthesia: General    Estimated Blood Loss: minimal    Implants:    Nothing was implanted during the procedure    Specimen:          Specimens       ID Source Type Tests Collected By Collected At Frozen?    A Cervix Tissue TISSUE PATHOLOGY EXAM   Malinda Conway MD 3/31/25 1159     Description: Cervical cone Stitch @ 12 oclock    B Endocervix Tissue TISSUE PATHOLOGY EXAM   Malinda Conway MD 3/31/25 0808     Description: Endocervical Currettage            Findings:   -cervix grossly normal  -nonstaining area noted at 12 o'clock    Complications: none    Description of Procedure: The patient was taken to the operating room and placed in supine position.  General anesthesia was administered.  The patient was repositioned into dorsal lithotomy in candy cane stirrups and prepped and draped in the usual sterile fashion.  Straight catheterization was performed.  The surgical time-out was completed for the procedure     Weighted speculum was placed in the vagina, and the cervix was grasped anteriorly with a single-tooth tenaculum.  Sutures were placed at 3 and 9 o'clock at cervicovaginal junction.  Dilute vasopressin was injected into cervix at 4 sites.  Lugol's solution was then applied to cervix with non-staining noted as above.  A scalpel was used to remove cone of cervix.  Specimen was tagged at 12 o'clock and passed off the field.  There was minimal bleeding and hemostasis was achieved with bovie cautery at edges of  cervix and Monsel's solution.  Endocervical curettage was performed.      The patient was awaken from anesthesia and transferred to PACU in stable condition.  She tolerated the procedure well.         Malinda Conway MD     Date: 3/31/2025  Time: 12:22 EDT

## 2025-03-31 NOTE — ANESTHESIA PROCEDURE NOTES
Airway  Reason: elective    Date/Time: 3/31/2025 11:20 AM  Airway not difficult    General Information and Staff    Patient location during procedure: OR  Anesthesiologist: Render, Patrice Ray, MD  CRNA/CAA: Violette Ochoa CRNA    Indications and Patient Condition  Indications for airway management: airway protection    Preoxygenated: yes    Mask difficulty assessment: 0 - not attempted    Final Airway Details    Final airway type: supraglottic airway      Successful airway: LMA  Size: 4   Number of attempts at approach: 1  Assessment: lips, teeth, and gum same as pre-op

## 2025-03-31 NOTE — H&P
[facility]   HISTORY AND PHYSICAL    Patient Name:Earl Maciel  : 1995  MRN: [id number]  Primary Care Physician: Cindy Singh APRN  Date of admission: 3/31/2025    Subjective   Subjective     Chief Complaint: HSIL    History of Present Illness   Earl Maciel is a 30 y.o. female presents with HSIL.    Reference Histopathology (2024 12:30)     Review of Systems   All other systems reviewed and are negative.        Personal History     Past Medical History:   Diagnosis Date    Abnormal Pap smear of cervix     Anemia     HPV (human papilloma virus) infection     Urinary tract infection        Past Surgical History:   Procedure Laterality Date     SECTION      WISDOM TOOTH EXTRACTION         Family History: Her family history includes Anxiety disorder in her mother and sister; Asthma in her maternal grandmother and mother; No Known Problems in her father; Thyroid disease in her mother.     Social History: She  reports that she has never smoked. She has never been exposed to tobacco smoke. She has never used smokeless tobacco. She reports that she does not currently use alcohol. She reports that she does not use drugs.    Home Medications:  ibuprofen, methocarbamol, and omeprazole    Allergies:  She has no known allergies.    Objective    Objective     Vitals:    Temp:  [99 °F (37.2 °C)] 99 °F (37.2 °C)  Heart Rate:  [77] 77  Resp:  [18] 18  BP: (138)/(99) 138/99    Physical Exam  Vitals and nursing note reviewed.   Constitutional:       General: She is not in acute distress.     Appearance: Normal appearance.   HENT:      Head: Normocephalic and atraumatic.   Eyes:      Extraocular Movements: Extraocular movements intact.   Cardiovascular:      Rate and Rhythm: Normal rate.   Pulmonary:      Effort: Pulmonary effort is normal. No respiratory distress.   Abdominal:      General: There is no distension.      Palpations: Abdomen is soft. There is no mass.      Tenderness: There is no  abdominal tenderness.   Musculoskeletal:         General: Normal range of motion.      Cervical back: Normal range of motion.   Skin:     General: Skin is warm and dry.   Neurological:      General: No focal deficit present.      Mental Status: She is alert and oriented to person, place, and time.   Psychiatric:         Mood and Affect: Mood normal.         Behavior: Behavior normal.          Result Review    Result Review:  I have personally reviewed the results from the time of this admission to 3/31/2025 10:47 EDT and agree with these findings:  []  Laboratory list / accordion  []  Microbiology  []  Radiology  []  EKG/Telemetry   []  Cardiology/Vascular   []  Pathology  []  Old records  []  Other:  Most notable findings include: see above      Assessment & Plan   Assessment / Plan     Brief Patient Summary:  Earl Maciel is a 30 y.o. female presents with HSIL    Active Hospital Problems:  Active Hospital Problems    Diagnosis     **HSIL (high grade squamous intraepithelial lesion) on Pap smear of cervix      Plan:   To OR for cold knife conization    VTE Prophylaxis:  Mechanical VTE prophylaxis orders are present.        Malinda Conway MD

## 2025-03-31 NOTE — ANESTHESIA POSTPROCEDURE EVALUATION
Patient: Earl Maciel    Procedure Summary       Date: 03/31/25 Room / Location: [facility] OR  / [facility] MAIN OR    Anesthesia Start: 1112 Anesthesia Stop: 1214    Procedure: CERVICAL CONIZATION (Cervix) Diagnosis:       HSIL (high grade squamous intraepithelial lesion) on Pap smear of cervix      (HSIL (high grade squamous intraepithelial lesion) on Pap smear of cervix [R87.613])    Surgeons: Malinda Conway MD Provider: Patrice Duffy MD    Anesthesia Type: general ASA Status: 3            Anesthesia Type: general    Vitals  Vitals Value Taken Time   /100 03/31/25 13:00   Temp 37.1 °C (98.8 °F) 03/31/25 12:13   Pulse 80 03/31/25 13:00   Resp 16 03/31/25 13:00   SpO2 100 % 03/31/25 13:00           Post Anesthesia Care and Evaluation    Patient location during evaluation: bedside  Patient participation: complete - patient participated  Level of consciousness: awake and alert  Pain management: adequate    Airway patency: patent  Anesthetic complications: No anesthetic complications    Cardiovascular status: acceptable  Respiratory status: acceptable  Hydration status: acceptable    Comments: /83   Pulse 70   Temp 37.1 °C (98.8 °F) (Oral)   Resp 16   SpO2 98%

## 2025-03-31 NOTE — ANESTHESIA PREPROCEDURE EVALUATION
Anesthesia Evaluation     NPO Solid Status: > 8 hours             Airway   Mallampati: III  TM distance: >3 FB  Neck ROM: full  Possible difficult intubation  Dental - normal exam     Pulmonary    (-) wheezes, not a smoker  Cardiovascular     Rhythm: regular        Neuro/Psych  GI/Hepatic/Renal/Endo      ROS Comment: BMI 45    Musculoskeletal     Abdominal    Substance History      OB/GYN          Other                    Anesthesia Plan    ASA 3     general     (  D/W R&B of GA including but not limited to: heart, lung, liver, kidney, neurologic problems, positioning injuries, dental damage, corneal abrasion and TMJ.  .)  intravenous induction     Anesthetic plan, risks, benefits, and alternatives have been provided, discussed and informed consent has been obtained with: patient.    CODE STATUS:

## 2025-04-02 ENCOUNTER — RESULTS FOLLOW-UP (OUTPATIENT)
Dept: PERIOP | Facility: HOSPITAL | Age: 30
End: 2025-04-02
Payer: COMMERCIAL

## 2025-04-02 DIAGNOSIS — R11.0 NAUSEA: Primary | ICD-10-CM

## 2025-04-02 LAB
CYTO UR: NORMAL
LAB AP CASE REPORT: NORMAL
LAB AP SPECIAL STAINS: NORMAL
PATH REPORT.FINAL DX SPEC: NORMAL
PATH REPORT.GROSS SPEC: NORMAL

## 2025-04-02 RX ORDER — ONDANSETRON 4 MG/1
4 TABLET, FILM COATED ORAL EVERY 8 HOURS PRN
Qty: 45 TABLET | Refills: 0 | Status: SHIPPED | OUTPATIENT
Start: 2025-04-02

## 2025-04-02 NOTE — PROGRESS NOTES
CAYDEN-1 only on cone specimen, ECC negative, message sent to patient and we will discuss further at follow-up visit

## 2025-04-04 ENCOUNTER — INFUSION (OUTPATIENT)
Dept: ONCOLOGY | Facility: HOSPITAL | Age: 30
End: 2025-04-04
Payer: COMMERCIAL

## 2025-04-04 VITALS
TEMPERATURE: 97.4 F | RESPIRATION RATE: 16 BRPM | HEART RATE: 82 BPM | BODY MASS INDEX: 40.9 KG/M2 | SYSTOLIC BLOOD PRESSURE: 125 MMHG | DIASTOLIC BLOOD PRESSURE: 84 MMHG | WEIGHT: 209.4 LBS | OXYGEN SATURATION: 100 %

## 2025-04-04 DIAGNOSIS — K90.9 IRON MALABSORPTION: ICD-10-CM

## 2025-04-04 DIAGNOSIS — D50.0 IRON DEFICIENCY ANEMIA DUE TO CHRONIC BLOOD LOSS: Primary | ICD-10-CM

## 2025-04-04 PROCEDURE — 25010000002 FAMOTIDINE 10 MG/ML SOLUTION: Performed by: INTERNAL MEDICINE

## 2025-04-04 PROCEDURE — 96375 TX/PRO/DX INJ NEW DRUG ADDON: CPT

## 2025-04-04 PROCEDURE — 25810000003 SODIUM CHLORIDE 0.9 % SOLUTION 250 ML FLEX CONT: Performed by: INTERNAL MEDICINE

## 2025-04-04 PROCEDURE — 96365 THER/PROPH/DIAG IV INF INIT: CPT

## 2025-04-04 PROCEDURE — 25010000002 IRON SUCROSE PER 1 MG: Performed by: INTERNAL MEDICINE

## 2025-04-04 PROCEDURE — 63710000001 DIPHENHYDRAMINE PER 50 MG: Performed by: INTERNAL MEDICINE

## 2025-04-04 RX ORDER — DIPHENHYDRAMINE HCL 25 MG
25 CAPSULE ORAL ONCE
Status: COMPLETED | OUTPATIENT
Start: 2025-04-04 | End: 2025-04-04

## 2025-04-04 RX ORDER — FAMOTIDINE 10 MG/ML
20 INJECTION, SOLUTION INTRAVENOUS ONCE
Status: COMPLETED | OUTPATIENT
Start: 2025-04-04 | End: 2025-04-04

## 2025-04-04 RX ORDER — ACETAMINOPHEN 325 MG/1
650 TABLET ORAL ONCE
Status: COMPLETED | OUTPATIENT
Start: 2025-04-04 | End: 2025-04-04

## 2025-04-04 RX ORDER — SODIUM CHLORIDE 9 MG/ML
20 INJECTION, SOLUTION INTRAVENOUS ONCE
Status: DISCONTINUED | OUTPATIENT
Start: 2025-04-04 | End: 2025-04-04 | Stop reason: HOSPADM

## 2025-04-04 RX ADMIN — DIPHENHYDRAMINE HYDROCHLORIDE 25 MG: 25 CAPSULE ORAL at 08:36

## 2025-04-04 RX ADMIN — FAMOTIDINE 20 MG: 10 INJECTION INTRAVENOUS at 08:36

## 2025-04-04 RX ADMIN — ACETAMINOPHEN 975 MG: 325 TABLET ORAL at 08:36

## 2025-04-04 RX ADMIN — SODIUM CHLORIDE 300 MG: 900 INJECTION, SOLUTION INTRAVENOUS at 08:56

## 2025-04-06 ENCOUNTER — APPOINTMENT (OUTPATIENT)
Dept: CT IMAGING | Facility: HOSPITAL | Age: 30
End: 2025-04-06
Payer: COMMERCIAL

## 2025-04-06 ENCOUNTER — HOSPITAL ENCOUNTER (EMERGENCY)
Facility: HOSPITAL | Age: 30
Discharge: HOME | End: 2025-04-06
Attending: EMERGENCY MEDICINE | Admitting: EMERGENCY MEDICINE
Payer: COMMERCIAL

## 2025-04-06 VITALS
TEMPERATURE: 97.7 F | HEART RATE: 74 BPM | DIASTOLIC BLOOD PRESSURE: 89 MMHG | SYSTOLIC BLOOD PRESSURE: 134 MMHG | OXYGEN SATURATION: 100 % | RESPIRATION RATE: 16 BRPM

## 2025-04-06 DIAGNOSIS — R10.9 LEFT SIDED ABDOMINAL PAIN: Primary | ICD-10-CM

## 2025-04-06 DIAGNOSIS — N83.201 RIGHT OVARIAN CYST: ICD-10-CM

## 2025-04-06 LAB
ALBUMIN SERPL-MCNC: 4.1 G/DL (ref 3.5–5.2)
ALBUMIN/GLOB SERPL: 1.3 G/DL
ALP SERPL-CCNC: 78 U/L (ref 39–117)
ALT SERPL W P-5'-P-CCNC: 13 U/L (ref 1–33)
ANION GAP SERPL CALCULATED.3IONS-SCNC: 8.6 MMOL/L (ref 5–15)
AST SERPL-CCNC: 13 U/L (ref 1–32)
BACTERIA UR QL AUTO: ABNORMAL /HPF
BASOPHILS # BLD AUTO: 0.05 10*3/MM3 (ref 0–0.2)
BASOPHILS NFR BLD AUTO: 0.7 % (ref 0–1.5)
BILIRUB SERPL-MCNC: 0.2 MG/DL (ref 0–1.2)
BILIRUB UR QL STRIP: NEGATIVE
BUN SERPL-MCNC: 11 MG/DL (ref 6–20)
BUN/CREAT SERPL: 11.1 (ref 7–25)
CALCIUM SPEC-SCNC: 8.8 MG/DL (ref 8.6–10.5)
CHLORIDE SERPL-SCNC: 106 MMOL/L (ref 98–107)
CLARITY UR: CLEAR
CO2 SERPL-SCNC: 23.4 MMOL/L (ref 22–29)
COLOR UR: YELLOW
CREAT SERPL-MCNC: 0.99 MG/DL (ref 0.57–1)
DEPRECATED RDW RBC AUTO: 49.8 FL (ref 37–54)
EGFRCR SERPLBLD CKD-EPI 2021: 79.8 ML/MIN/1.73
EOSINOPHIL # BLD AUTO: 0.2 10*3/MM3 (ref 0–0.4)
EOSINOPHIL NFR BLD AUTO: 2.7 % (ref 0.3–6.2)
ERYTHROCYTE [DISTWIDTH] IN BLOOD BY AUTOMATED COUNT: 18.5 % (ref 12.3–15.4)
GLOBULIN UR ELPH-MCNC: 3.1 GM/DL
GLUCOSE SERPL-MCNC: 108 MG/DL (ref 65–99)
GLUCOSE UR STRIP-MCNC: NEGATIVE MG/DL
HCG SERPL QL: NEGATIVE
HCT VFR BLD AUTO: 32.3 % (ref 34–46.6)
HGB BLD-MCNC: 9.5 G/DL (ref 12–15.9)
HGB UR QL STRIP.AUTO: ABNORMAL
HOLD SPECIMEN: NORMAL
HYALINE CASTS UR QL AUTO: ABNORMAL /LPF
IMM GRANULOCYTES # BLD AUTO: 0.01 10*3/MM3 (ref 0–0.05)
IMM GRANULOCYTES NFR BLD AUTO: 0.1 % (ref 0–0.5)
KETONES UR QL STRIP: ABNORMAL
LEUKOCYTE ESTERASE UR QL STRIP.AUTO: ABNORMAL
LIPASE SERPL-CCNC: 23 U/L (ref 13–60)
LYMPHOCYTES # BLD AUTO: 2.79 10*3/MM3 (ref 0.7–3.1)
LYMPHOCYTES NFR BLD AUTO: 37.5 % (ref 19.6–45.3)
MCH RBC QN AUTO: 22.1 PG (ref 26.6–33)
MCHC RBC AUTO-ENTMCNC: 29.4 G/DL (ref 31.5–35.7)
MCV RBC AUTO: 75.1 FL (ref 79–97)
MONOCYTES # BLD AUTO: 0.91 10*3/MM3 (ref 0.1–0.9)
MONOCYTES NFR BLD AUTO: 12.2 % (ref 5–12)
NEUTROPHILS NFR BLD AUTO: 3.48 10*3/MM3 (ref 1.7–7)
NEUTROPHILS NFR BLD AUTO: 46.8 % (ref 42.7–76)
NITRITE UR QL STRIP: NEGATIVE
NRBC BLD AUTO-RTO: 0 /100 WBC (ref 0–0.2)
PH UR STRIP.AUTO: 6 [PH] (ref 5–8)
PLATELET # BLD AUTO: 394 10*3/MM3 (ref 140–450)
PMV BLD AUTO: 10.8 FL (ref 6–12)
POTASSIUM SERPL-SCNC: 3.8 MMOL/L (ref 3.5–5.2)
PROT SERPL-MCNC: 7.2 G/DL (ref 6–8.5)
PROT UR QL STRIP: NEGATIVE
RBC # BLD AUTO: 4.3 10*6/MM3 (ref 3.77–5.28)
RBC # UR STRIP: ABNORMAL /HPF
REF LAB TEST METHOD: ABNORMAL
SODIUM SERPL-SCNC: 138 MMOL/L (ref 136–145)
SP GR UR STRIP: 1.03 (ref 1–1.03)
SQUAMOUS #/AREA URNS HPF: ABNORMAL /HPF
UROBILINOGEN UR QL STRIP: ABNORMAL
WBC # UR STRIP: ABNORMAL /HPF
WBC NRBC COR # BLD AUTO: 7.44 10*3/MM3 (ref 3.4–10.8)
WHOLE BLOOD HOLD COAG: NORMAL
WHOLE BLOOD HOLD SPECIMEN: NORMAL

## 2025-04-06 PROCEDURE — 84703 CHORIONIC GONADOTROPIN ASSAY: CPT | Performed by: EMERGENCY MEDICINE

## 2025-04-06 PROCEDURE — 83690 ASSAY OF LIPASE: CPT | Performed by: EMERGENCY MEDICINE

## 2025-04-06 PROCEDURE — 99285 EMERGENCY DEPT VISIT HI MDM: CPT

## 2025-04-06 PROCEDURE — 25510000001 IOPAMIDOL 61 % SOLUTION: Performed by: EMERGENCY MEDICINE

## 2025-04-06 PROCEDURE — 85025 COMPLETE CBC W/AUTO DIFF WBC: CPT | Performed by: EMERGENCY MEDICINE

## 2025-04-06 PROCEDURE — 74177 CT ABD & PELVIS W/CONTRAST: CPT

## 2025-04-06 PROCEDURE — 81001 URINALYSIS AUTO W/SCOPE: CPT | Performed by: EMERGENCY MEDICINE

## 2025-04-06 PROCEDURE — 80053 COMPREHEN METABOLIC PANEL: CPT | Performed by: EMERGENCY MEDICINE

## 2025-04-06 RX ORDER — SODIUM CHLORIDE 0.9 % (FLUSH) 0.9 %
10 SYRINGE (ML) INJECTION AS NEEDED
Status: DISCONTINUED | OUTPATIENT
Start: 2025-04-06 | End: 2025-04-06 | Stop reason: HOSPADM

## 2025-04-06 RX ORDER — POLYETHYLENE GLYCOL 3350 17 G/17G
17 POWDER, FOR SOLUTION ORAL DAILY
Qty: 30 EACH | Refills: 0 | Status: SHIPPED | OUTPATIENT
Start: 2025-04-06

## 2025-04-06 RX ORDER — FAMOTIDINE 20 MG/1
20 TABLET, FILM COATED ORAL 2 TIMES DAILY
Qty: 60 TABLET | Refills: 0 | Status: SHIPPED | OUTPATIENT
Start: 2025-04-06 | End: 2025-05-06

## 2025-04-06 RX ORDER — IOPAMIDOL 612 MG/ML
85 INJECTION, SOLUTION INTRAVASCULAR
Status: COMPLETED | OUTPATIENT
Start: 2025-04-06 | End: 2025-04-06

## 2025-04-06 RX ADMIN — IOPAMIDOL 85 ML: 612 INJECTION, SOLUTION INTRAVENOUS at 03:00

## 2025-04-06 NOTE — ED PROVIDER NOTES
EMERGENCY DEPARTMENT ENCOUNTER    History  Chief Complaint   Patient presents with    Abdominal Pain    Flank Pain       History provided by: Patient    HPI:  Context: Earl Maciel is a 30 y.o. female with a medical history of anemia, HPV/CAYDEN who presents to the ED c/o acute abdominal pain.  Symptoms have been present for the past 2 to 3 weeks.  She has left-sided abdominal pain in the left upper and lower quadrant.  She has not had any associated nausea or vomiting.  She feels like she cannot finish a full meal due to bloating.  She has been constipated.  She has a bowel movement about every 3 days.  She did do a trial of MiraLAX, magnesium, which helped, she did this once.  She has not been on a daily regimen.      Past Medical History:  Active Ambulatory Problems     Diagnosis Date Noted    Iron deficiency anemia due to chronic blood loss 09/08/2022    Adverse effect of iron 09/08/2022    Class 2 obesity due to excess calories without serious comorbidity with body mass index (BMI) of 38.0 to 38.9 in adult 11/16/2022    Menorrhagia with regular cycle 12/02/2022    Mass overlapping multiple quadrants of left breast 01/23/2023    Iron malabsorption 02/29/2024    CAYDEN III (cervical intraepithelial neoplasia grade III) with severe dysplasia 07/25/2024    High grade squamous intraepithelial lesion (HGSIL), grade 3 CAYDEN, on biopsy of cervix 07/25/2024    Abnormal uterine bleeding (AUB) 07/25/2024    Thickened endometrium 07/25/2024    HSIL (high grade squamous intraepithelial lesion) on Pap smear of cervix 02/13/2025     Resolved Ambulatory Problems     Diagnosis Date Noted    Thyroid disease 09/12/2016    Annual physical exam 08/30/2022    ROSA (iron deficiency anemia) 08/30/2022    Sore throat 10/18/2022    Acute effusion of right ear 10/18/2022    Viral upper respiratory tract infection 09/05/2023     Past Medical History:   Diagnosis Date    Abnormal Pap smear of cervix     Anemia     HPV (human papilloma virus)  infection     Urinary tract infection        Past Surgical History:  Past Surgical History:   Procedure Laterality Date    CERVICAL CONIZATION N/A 3/31/2025    Procedure: CERVICAL CONIZATION;  Surgeon: Malinda Conway MD;  Location: [facility] OR;  Service: Obstetrics/Gynecology;  Laterality: N/A;     SECTION      WISDOM TOOTH EXTRACTION           Family History:  Family History   Problem Relation Age of Onset    Anxiety disorder Mother     Asthma Mother     Thyroid disease Mother     No Known Problems Father     Anxiety disorder Sister     Asthma Maternal Grandmother     Breast cancer Neg Hx     Ovarian cancer Neg Hx     Uterine cancer Neg Hx     Colon cancer Neg Hx     Malig Hyperthermia Neg Hx          Social History:  Social History     Socioeconomic History    Marital status: Single   Tobacco Use    Smoking status: Never     Passive exposure: Never    Smokeless tobacco: Never   Vaping Use    Vaping status: Never Used   Substance and Sexual Activity    Alcohol use: Not Currently     Comment: occ    Drug use: Never    Sexual activity: Yes     Partners: Female     Birth control/protection: Partner of same sex         Allergies:  Patient has no known allergies.        Physical Exam  ED Triage Vitals   Temp Heart Rate Resp BP SpO2   25 0037 25 0037 25 0037 25 0039 25 0037   97.7 °F (36.5 °C) 102 18 127/83 100 %      Temp src Heart Rate Source Patient Position BP Location FiO2 (%)   -- -- -- -- --            Physical Exam  Constitutional:       Appearance: Normal appearance.   HENT:      Head: Normocephalic and atraumatic.   Eyes:      Conjunctiva/sclera: Conjunctivae normal.   Pulmonary:      Effort: Pulmonary effort is normal. No respiratory distress.   Abdominal:      Tenderness: There is abdominal tenderness (LUQ, LLQ). There is no guarding or rebound.   Neurological:      Mental Status: She is alert and oriented to person, place, and time.             Medications  Given in ER:   Medications   sodium chloride 0.9 % flush 10 mL (has no administration in time range)   iopamidol (ISOVUE-300) 61 % injection 85 mL (85 mL Intravenous Given 4/6/25 0300)         Orders Placed:  Orders Placed This Encounter   Procedures    CT Abdomen Pelvis With Contrast    [place] Draw    Comprehensive Metabolic Panel    Lipase    Urinalysis With Microscopic If Indicated (No Culture) - Urine, Clean Catch    hCG, Serum, Qualitative    CBC Auto Differential    Urinalysis, Microscopic Only - Urine, Clean Catch    Insert Peripheral IV    CBC & Differential    Green Top (Gel)    Lavender Top    Light Blue Top         Outpatient Medication Management:   Current Facility-Administered Medications Ordered in Epic   Medication Dose Route Frequency Provider Last Rate Last Admin    sodium chloride 0.9 % flush 10 mL  10 mL Intravenous PRN Tatyana Kirkpatrick MD         Current Outpatient Medications Ordered in Epic   Medication Sig Dispense Refill    acetaminophen (TYLENOL) 325 MG tablet Take 2 tablets by mouth Every 6 (Six) Hours. 60 tablet 1    famotidine (PEPCID) 20 MG tablet Take 1 tablet by mouth 2 (Two) Times a Day for 30 days. 60 tablet 0    ibuprofen (ADVIL,MOTRIN) 600 MG tablet Take 1 tablet by mouth Every 6 (Six) Hours. 60 tablet 1    ibuprofen (ADVIL,MOTRIN) 800 MG tablet Take 1 tablet by mouth Every 6 (Six) Hours As Needed for Mild Pain or Moderate Pain. 30 tablet 1    omeprazole (priLOSEC) 20 MG capsule Take 2 capsules by mouth Daily. 180 capsule 3    ondansetron (Zofran) 4 MG tablet Take 1 tablet by mouth Every 8 (Eight) Hours As Needed for Nausea or Vomiting. 45 tablet 0    polyethylene glycol (MIRALAX) 17 g packet Take 17 g by mouth Daily. 30 each 0           Medical Decision Making:  All labs have been independently interpreted by me.  All radiology studies have been reviewed by me. All EKG's have been independently viewed and interpreted by me.  Discussion below represents my analysis of pertinent  findings related to patient's condition, differential diagnosis, treatment plan and final disposition.    Differential Diagnosis includes but not limited to: Constipation, colitis, diverticulitis, obstruction, volvulus    Review of prior external notes (non-ED) -and- Review of prior external test results outside of this encounter:  Internal medicine office visit note from 3/24/2025, patient was treated for constipation symptoms    Labs Results:  Recent Results (from the past 24 hours)   Comprehensive Metabolic Panel    Collection Time: 04/06/25 12:52 AM    Specimen: Blood   Result Value Ref Range    Glucose 108 (H) 65 - 99 mg/dL    BUN 11 6 - 20 mg/dL    Creatinine 0.99 0.57 - 1.00 mg/dL    Sodium 138 136 - 145 mmol/L    Potassium 3.8 3.5 - 5.2 mmol/L    Chloride 106 98 - 107 mmol/L    CO2 23.4 22.0 - 29.0 mmol/L    Calcium 8.8 8.6 - 10.5 mg/dL    Total Protein 7.2 6.0 - 8.5 g/dL    Albumin 4.1 3.5 - 5.2 g/dL    ALT (SGPT) 13 1 - 33 U/L    AST (SGOT) 13 1 - 32 U/L    Alkaline Phosphatase 78 39 - 117 U/L    Total Bilirubin 0.2 0.0 - 1.2 mg/dL    Globulin 3.1 gm/dL    A/G Ratio 1.3 g/dL    BUN/Creatinine Ratio 11.1 7.0 - 25.0    Anion Gap 8.6 5.0 - 15.0 mmol/L    eGFR 79.8 >60.0 mL/min/1.73   Lipase    Collection Time: 04/06/25 12:52 AM    Specimen: Blood   Result Value Ref Range    Lipase 23 13 - 60 U/L   hCG, Serum, Qualitative    Collection Time: 04/06/25 12:52 AM    Specimen: Blood   Result Value Ref Range    HCG Qualitative Negative Negative   Green Top (Gel)    Collection Time: 04/06/25 12:52 AM   Result Value Ref Range    Extra Tube Hold for add-ons.    Lavender Top    Collection Time: 04/06/25 12:52 AM   Result Value Ref Range    Extra Tube hold for add-on    Light Blue Top    Collection Time: 04/06/25 12:52 AM   Result Value Ref Range    Extra Tube Hold for add-ons.    CBC Auto Differential    Collection Time: 04/06/25 12:52 AM    Specimen: Blood   Result Value Ref Range    WBC 7.44 3.40 - 10.80 10*3/mm3     RBC 4.30 3.77 - 5.28 10*6/mm3    Hemoglobin 9.5 (L) 12.0 - 15.9 g/dL    Hematocrit 32.3 (L) 34.0 - 46.6 %    MCV 75.1 (L) 79.0 - 97.0 fL    MCH 22.1 (L) 26.6 - 33.0 pg    MCHC 29.4 (L) 31.5 - 35.7 g/dL    RDW 18.5 (H) 12.3 - 15.4 %    RDW-SD 49.8 37.0 - 54.0 fl    MPV 10.8 6.0 - 12.0 fL    Platelets 394 140 - 450 10*3/mm3    Neutrophil % 46.8 42.7 - 76.0 %    Lymphocyte % 37.5 19.6 - 45.3 %    Monocyte % 12.2 (H) 5.0 - 12.0 %    Eosinophil % 2.7 0.3 - 6.2 %    Basophil % 0.7 0.0 - 1.5 %    Immature Grans % 0.1 0.0 - 0.5 %    Neutrophils, Absolute 3.48 1.70 - 7.00 10*3/mm3    Lymphocytes, Absolute 2.79 0.70 - 3.10 10*3/mm3    Monocytes, Absolute 0.91 (H) 0.10 - 0.90 10*3/mm3    Eosinophils, Absolute 0.20 0.00 - 0.40 10*3/mm3    Basophils, Absolute 0.05 0.00 - 0.20 10*3/mm3    Immature Grans, Absolute 0.01 0.00 - 0.05 10*3/mm3    nRBC 0.0 0.0 - 0.2 /100 WBC   Urinalysis With Microscopic If Indicated (No Culture) - Urine, Clean Catch    Collection Time: 04/06/25  2:02 AM    Specimen: Urine, Clean Catch   Result Value Ref Range    Color, UA Yellow Yellow, Straw    Appearance, UA Clear Clear    pH, UA 6.0 5.0 - 8.0    Specific Gravity, UA 1.030 1.005 - 1.030    Glucose, UA Negative Negative    Ketones, UA Trace (A) Negative    Bilirubin, UA Negative Negative    Blood, UA Small (1+) (A) Negative    Protein, UA Negative Negative    Leuk Esterase, UA Trace (A) Negative    Nitrite, UA Negative Negative    Urobilinogen, UA 1.0 E.U./dL 0.2 - 1.0 E.U./dL   Urinalysis, Microscopic Only - Urine, Clean Catch    Collection Time: 04/06/25  2:02 AM    Specimen: Urine, Clean Catch   Result Value Ref Range    RBC, UA 3-5 (A) None Seen, 0-2 /HPF    WBC, UA 3-5 (A) None Seen, 0-2 /HPF    Bacteria, UA None Seen None Seen /HPF    Squamous Epithelial Cells, UA 0-2 None Seen, 0-2 /HPF    Hyaline Casts, UA None Seen None Seen /LPF    Methodology Automated Microscopy      Lab Comments:  My independent interpretation of the above labs: stable  anemia      Radiology:  CT Abdomen Pelvis With Contrast  Result Date: 4/6/2025  Patient: FRIDA VALLEJO  Time Out: 05:15 Exam(s): CT ABDOMEN + PELVIS With Contrast EXAM:   CT Abdomen and Pelvis With Intravenous Contrast CLINICAL HISTORY:    Reason for exam: L sided abd pain. TECHNIQUE:   Axial computed tomography images of the abdomen and pelvis with intravenous contrast.  This CT exam was performed according to the principle of ALARA (As Low As Reasonably Achievable) by using one or more of the following dose reduction techniques: automated exposure control, adjustment of the mA and or kV according to patient size, and or use of iterative reconstruction technique. COMPARISON:   No relevant prior studies available. FINDINGS:   Lung bases:  Unremarkable.  No mass.  No consolidation.  ABDOMEN:   Liver:  There are subcentimeter left hepatic lobe cysts.   Gallbladder and bile ducts:  Unremarkable.  No calcified stones.  No ductal dilation.   Pancreas:  Unremarkable.  No mass.  No ductal dilation.   Spleen:  Unremarkable.  No splenomegaly.   Adrenals:  Unremarkable.  No mass.   Kidneys and ureters:  Unremarkable.  No solid mass.  No hydronephrosis.   Stomach and bowel:  Unremarkable.  No obstruction.  No mucosal thickening.  PELVIS:   Appendix:  Normal appendix.   Bladder:  Unremarkable.  No mass.   Reproductive:  2.1 x 1.2 cm involuting right ovarian cyst.  ABDOMEN and PELVIS:   Intraperitoneal space:  Unremarkable.  No free air.  No significant fluid collection.   Bones joints:  No acute fracture.  No dislocation.   Soft tissues:  Unremarkable.   Vasculature:  Unremarkable.  No abdominal aortic aneurysm.   Lymph nodes:  Unremarkable.  No enlarged lymph nodes. IMPRESSION:     1.  No acute intra-abdominal pathology.  Normal appendix. 2.  2.1 x 1.2 cm involuting right ovarian cyst.     Electronically signed by Mary Jordan MD on 04-06-25 at 0515    Radiology Comments:  I ordered the above imaging and reviewed the  results.    My independent interpretation of the ct abd pelvis: No pneumoperitoneum    Rationale:  This is an overall well-appearing 30-year-old female presenting with complaints of abdominal pain.  This has been going on for a few weeks..  She does note infrequent bowel function.  She presents afebrile with otherwise unremarkable vital signs..  Exam is benign, there is no evidence of peritonitis or other acute surgical exam findings.  She was initially evaluated with labs.  This did not demonstrate any acute metabolic dysfunction and she has her baseline level of anemia today.  UA is noncontributory and she is not having any urinary tract symptoms.    She was evaluated with a CT scan of the abdomen and pelvis.  This did not demonstrate any acute intra-abdominal pathology.  There was a right ovarian cyst.  This is not where she is painful and I suspect this is a normal cyclical change.     Given reports of constipation, will start daily MiraLAX.  Will also add Pepcid to her regimen to see if this improves her symptoms.  She will otherwise need to follow-up with her PCP for reevaluation.    Clinical Scores:                                   Progress Notes:  5:35 AM EDT: I spoke with the patient about her ED work up, diagnosis and the plan for discharge. I discussed the importance of following up with her PCP. I instructed her to return to the Emergency Room for new or worsening symptoms. All of the pt's questions were answered. The patient is stable at time of discharge.      Complexity of Care:  Admission was considered but after careful review of the patient's presentation, physical examination, diagnostic results, and response to treatment the patient may be safely discharged with outpatient follow-up.    Diagnosis:  Final diagnoses:   Left sided abdominal pain   Right ovarian cyst       Follow Up:  Cindy Singh, APRN  [address] [facility]  [address] [place] [id number] [phone]    Call in 2 days  For  reevaluation    [facility] EMERGENCY DEPARTMENT  [address]  [facility] [id number] [phone]    As needed, If symptoms worsen      Rx:  Current Discharge Medication List        START taking these medications    Details   famotidine (PEPCID) 20 MG tablet Take 1 tablet by mouth 2 (Two) Times a Day for 30 days.  Qty: 60 tablet, Refills: 0      polyethylene glycol (MIRALAX) 17 g packet Take 17 g by mouth Daily.  Qty: 30 each, Refills: 0                 Parts of this note may be an electronic transcription/translation of spoken language to printed text using the Dragon dictation system        Provider Note Signed by:     Tatyana Kirkpatrick MD  04/06/25 1263

## 2025-04-08 ENCOUNTER — INFUSION (OUTPATIENT)
Dept: ONCOLOGY | Facility: HOSPITAL | Age: 30
End: 2025-04-08
Payer: COMMERCIAL

## 2025-04-08 VITALS
RESPIRATION RATE: 16 BRPM | HEART RATE: 84 BPM | BODY MASS INDEX: 40.27 KG/M2 | SYSTOLIC BLOOD PRESSURE: 122 MMHG | WEIGHT: 206.2 LBS | DIASTOLIC BLOOD PRESSURE: 77 MMHG | OXYGEN SATURATION: 99 % | TEMPERATURE: 98.6 F

## 2025-04-08 DIAGNOSIS — K90.9 IRON MALABSORPTION: ICD-10-CM

## 2025-04-08 DIAGNOSIS — D50.0 IRON DEFICIENCY ANEMIA DUE TO CHRONIC BLOOD LOSS: Primary | ICD-10-CM

## 2025-04-08 PROCEDURE — 25010000002 FAMOTIDINE 10 MG/ML SOLUTION: Performed by: INTERNAL MEDICINE

## 2025-04-08 PROCEDURE — 96375 TX/PRO/DX INJ NEW DRUG ADDON: CPT

## 2025-04-08 PROCEDURE — 96365 THER/PROPH/DIAG IV INF INIT: CPT

## 2025-04-08 PROCEDURE — 96374 THER/PROPH/DIAG INJ IV PUSH: CPT

## 2025-04-08 PROCEDURE — 63710000001 DIPHENHYDRAMINE PER 50 MG: Performed by: INTERNAL MEDICINE

## 2025-04-08 PROCEDURE — 25010000002 IRON SUCROSE PER 1 MG: Performed by: INTERNAL MEDICINE

## 2025-04-08 PROCEDURE — 25810000003 SODIUM CHLORIDE 0.9 % SOLUTION 250 ML FLEX CONT: Performed by: INTERNAL MEDICINE

## 2025-04-08 RX ORDER — ACETAMINOPHEN 325 MG/1
650 TABLET ORAL ONCE
Status: COMPLETED | OUTPATIENT
Start: 2025-04-08 | End: 2025-04-08

## 2025-04-08 RX ORDER — DIPHENHYDRAMINE HCL 25 MG
25 CAPSULE ORAL ONCE
Status: COMPLETED | OUTPATIENT
Start: 2025-04-08 | End: 2025-04-08

## 2025-04-08 RX ORDER — FAMOTIDINE 10 MG/ML
20 INJECTION, SOLUTION INTRAVENOUS ONCE
Status: COMPLETED | OUTPATIENT
Start: 2025-04-08 | End: 2025-04-08

## 2025-04-08 RX ADMIN — FAMOTIDINE 20 MG: 10 INJECTION INTRAVENOUS at 08:29

## 2025-04-08 RX ADMIN — DIPHENHYDRAMINE HYDROCHLORIDE 25 MG: 25 CAPSULE ORAL at 08:29

## 2025-04-08 RX ADMIN — SODIUM CHLORIDE 300 MG: 900 INJECTION, SOLUTION INTRAVENOUS at 09:00

## 2025-04-08 RX ADMIN — ACETAMINOPHEN 650 MG: 325 TABLET ORAL at 08:29

## 2025-04-11 DIAGNOSIS — R19.02 ABDOMINAL MASS, LUQ (LEFT UPPER QUADRANT): ICD-10-CM

## 2025-04-11 DIAGNOSIS — K90.49 FOOD INTOLERANCE: ICD-10-CM

## 2025-04-11 DIAGNOSIS — R10.32 LLQ PAIN: ICD-10-CM

## 2025-04-11 DIAGNOSIS — R11.0 NAUSEA: Primary | ICD-10-CM

## 2025-04-11 RX ORDER — SUCRALFATE 1 G/1
1 TABLET ORAL 4 TIMES DAILY
Qty: 60 TABLET | Refills: 0 | Status: SHIPPED | OUTPATIENT
Start: 2025-04-11

## 2025-04-15 ENCOUNTER — INFUSION (OUTPATIENT)
Dept: ONCOLOGY | Facility: HOSPITAL | Age: 30
End: 2025-04-15
Payer: COMMERCIAL

## 2025-04-15 VITALS
RESPIRATION RATE: 16 BRPM | TEMPERATURE: 98.4 F | WEIGHT: 206 LBS | SYSTOLIC BLOOD PRESSURE: 111 MMHG | BODY MASS INDEX: 40.23 KG/M2 | DIASTOLIC BLOOD PRESSURE: 76 MMHG | OXYGEN SATURATION: 100 % | HEART RATE: 73 BPM

## 2025-04-15 DIAGNOSIS — D50.0 IRON DEFICIENCY ANEMIA DUE TO CHRONIC BLOOD LOSS: Primary | ICD-10-CM

## 2025-04-15 DIAGNOSIS — K90.9 IRON MALABSORPTION: ICD-10-CM

## 2025-04-15 PROCEDURE — 63710000001 DIPHENHYDRAMINE PER 50 MG: Performed by: INTERNAL MEDICINE

## 2025-04-15 PROCEDURE — 96365 THER/PROPH/DIAG IV INF INIT: CPT

## 2025-04-15 PROCEDURE — 25010000002 IRON SUCROSE PER 1 MG: Performed by: INTERNAL MEDICINE

## 2025-04-15 PROCEDURE — 25010000002 FAMOTIDINE 10 MG/ML SOLUTION: Performed by: INTERNAL MEDICINE

## 2025-04-15 PROCEDURE — 96366 THER/PROPH/DIAG IV INF ADDON: CPT

## 2025-04-15 PROCEDURE — 25810000003 SODIUM CHLORIDE 0.9 % SOLUTION 250 ML FLEX CONT: Performed by: INTERNAL MEDICINE

## 2025-04-15 PROCEDURE — 96375 TX/PRO/DX INJ NEW DRUG ADDON: CPT

## 2025-04-15 RX ORDER — FAMOTIDINE 10 MG/ML
20 INJECTION, SOLUTION INTRAVENOUS ONCE
Status: COMPLETED | OUTPATIENT
Start: 2025-04-15 | End: 2025-04-15

## 2025-04-15 RX ORDER — ACETAMINOPHEN 325 MG/1
650 TABLET ORAL ONCE
Status: COMPLETED | OUTPATIENT
Start: 2025-04-15 | End: 2025-04-15

## 2025-04-15 RX ORDER — SODIUM CHLORIDE 9 MG/ML
20 INJECTION, SOLUTION INTRAVENOUS ONCE
Status: DISCONTINUED | OUTPATIENT
Start: 2025-04-15 | End: 2025-04-15 | Stop reason: HOSPADM

## 2025-04-15 RX ORDER — DIPHENHYDRAMINE HCL 25 MG
25 CAPSULE ORAL ONCE
Status: COMPLETED | OUTPATIENT
Start: 2025-04-15 | End: 2025-04-15

## 2025-04-15 RX ADMIN — ACETAMINOPHEN 650 MG: 325 TABLET ORAL at 08:17

## 2025-04-15 RX ADMIN — FAMOTIDINE 20 MG: 10 INJECTION INTRAVENOUS at 08:17

## 2025-04-15 RX ADMIN — DIPHENHYDRAMINE HYDROCHLORIDE 25 MG: 25 CAPSULE ORAL at 08:17

## 2025-04-15 RX ADMIN — IRON SUCROSE 300 MG: 20 INJECTION, SOLUTION INTRAVENOUS at 08:31

## 2025-04-17 ENCOUNTER — OFFICE VISIT (OUTPATIENT)
Dept: OBSTETRICS AND GYNECOLOGY | Age: 30
End: 2025-04-17
Payer: COMMERCIAL

## 2025-04-17 VITALS
WEIGHT: 206 LBS | SYSTOLIC BLOOD PRESSURE: 110 MMHG | HEIGHT: 61 IN | DIASTOLIC BLOOD PRESSURE: 82 MMHG | BODY MASS INDEX: 38.89 KG/M2

## 2025-04-17 DIAGNOSIS — Z98.890 POST-OPERATIVE STATE: Primary | ICD-10-CM

## 2025-04-17 NOTE — PROGRESS NOTES
[facility]   Obstetrics and Gynecology     2025      Patient:  Earl Maciel   MR#:[id number]    Office note    Chief Complaint   Patient presents with    Post-op     Cc: Post OP from cervical conization on 3/31/25  , no complaints , started period on  does not want exam today         Subjective     History of Present Illness  30 y.o. female  s/p CKC 3/31/25 presents for postop follow-up.  She is doing great no pain or vaginal bleeding.  She did to start her menses and declines exam today.  She noticed an odor in the first few days after surgery but that has since resolved.      Relevant data reviewed:  Tissue Pathology Exam (2025 11:52) CAYDEN 1    Patient Active Problem List   Diagnosis    Iron deficiency anemia due to chronic blood loss    Adverse effect of iron    Class 2 obesity due to excess calories without serious comorbidity with body mass index (BMI) of 38.0 to 38.9 in adult    Menorrhagia with regular cycle    Mass overlapping multiple quadrants of left breast    Iron malabsorption    CAYDEN III (cervical intraepithelial neoplasia grade III) with severe dysplasia    High grade squamous intraepithelial lesion (HGSIL), grade 3 CAYDEN, on biopsy of cervix    Abnormal uterine bleeding (AUB)    Thickened endometrium    HSIL (high grade squamous intraepithelial lesion) on Pap smear of cervix       Past Medical History:   Diagnosis Date    Abnormal Pap smear of cervix     Anemia     HPV (human papilloma virus) infection     Urinary tract infection      Past Surgical History:   Procedure Laterality Date    CERVICAL CONIZATION N/A 3/31/2025    Procedure: CERVICAL CONIZATION;  Surgeon: Malinda Conway MD;  Location: [facility];  Service: Obstetrics/Gynecology;  Laterality: N/A;     SECTION      WISDOM TOOTH EXTRACTION       Obstetric History:  OB History          1    Para   1    Term   1            AB        Living   1         SAB        IAB         Ectopic        Molar        Multiple        Live Births   1               Menstrual History:     Patient's last menstrual period was 2025 (approximate).       # 1 - Date: 01/06/15, Sex: Male, Weight: 3232 g (7 lb 2 oz), GA: None, Type: , Low Transverse, Apgar1: None, Apgar5: None, Living: Living, Birth Comments: None    Family History   Problem Relation Age of Onset    Anxiety disorder Mother     Asthma Mother     Thyroid disease Mother     No Known Problems Father     Anxiety disorder Sister     Asthma Maternal Grandmother     Breast cancer Neg Hx     Ovarian cancer Neg Hx     Uterine cancer Neg Hx     Colon cancer Neg Hx     Malig Hyperthermia Neg Hx      Social History     Tobacco Use    Smoking status: Never     Passive exposure: Never    Smokeless tobacco: Never   Vaping Use    Vaping status: Never Used   Substance Use Topics    Alcohol use: Not Currently     Comment: occ    Drug use: Never     Patient has no known allergies.    Current Outpatient Medications:     famotidine (PEPCID) 20 MG tablet, Take 1 tablet by mouth 2 (Two) Times a Day for 30 days., Disp: 60 tablet, Rfl: 0    ibuprofen (ADVIL,MOTRIN) 800 MG tablet, Take 1 tablet by mouth Every 6 (Six) Hours As Needed for Mild Pain or Moderate Pain., Disp: 30 tablet, Rfl: 1    omeprazole (priLOSEC) 20 MG capsule, Take 2 capsules by mouth Daily., Disp: 180 capsule, Rfl: 3    ondansetron (Zofran) 4 MG tablet, Take 1 tablet by mouth Every 8 (Eight) Hours As Needed for Nausea or Vomiting., Disp: 45 tablet, Rfl: 0    polyethylene glycol (MIRALAX) 17 g packet, Take 17 g by mouth Daily., Disp: 30 each, Rfl: 0    sucralfate (Carafate) 1 g tablet, Take 1 tablet by mouth 4 (Four) Times a Day., Disp: 60 tablet, Rfl: 0    acetaminophen (TYLENOL) 325 MG tablet, Take 2 tablets by mouth Every 6 (Six) Hours. (Patient not taking: Reported on 2025), Disp: 60 tablet, Rfl: 1    ibuprofen (ADVIL,MOTRIN) 600 MG tablet, Take 1 tablet by mouth Every 6 (Six)  "Hours. (Patient not taking: Reported on 4/17/2025), Disp: 60 tablet, Rfl: 1    The following portions of the patient's history were reviewed and updated as appropriate: allergies, current medications, past family history, past medical history, past social history, past surgical history, and problem list.    Review of Systems   All other systems reviewed and are negative.      BP Readings from Last 3 Encounters:   04/17/25 110/82   04/15/25 111/76   04/08/25 122/77      Wt Readings from Last 3 Encounters:   04/17/25 93.4 kg (206 lb)   04/15/25 93.4 kg (206 lb)   04/08/25 93.5 kg (206 lb 3.2 oz)      BMI: Estimated body mass index is 38.92 kg/m² as calculated from the following:    Height as of this encounter: 154.9 cm (61\").    Weight as of this encounter: 93.4 kg (206 lb). BSA: Estimated body surface area is 1.91 meters squared as calculated from the following:    Height as of this encounter: 154.9 cm (61\").    Weight as of this encounter: 93.4 kg (206 lb).    Objective   Physical Exam  Vitals and nursing note reviewed.   Constitutional:       General: She is not in acute distress.  Pulmonary:      Effort: Pulmonary effort is normal. No respiratory distress.   Neurological:      General: No focal deficit present.      Mental Status: She is alert and oriented to person, place, and time.   Psychiatric:         Mood and Affect: Mood normal.         Behavior: Behavior normal.         Thought Content: Thought content normal.         Judgment: Judgment normal.         Assessment & Plan     Diagnoses and all orders for this visit:    1. Post-operative state (Primary)    -Doing great postop, no acute issues  - Reviewed pathology consistent with CAYDEN-1, no precancerous lesions identified.  - Recommend repeat Pap in 1 year    Return in about 1 year (around 4/17/2026) for Annual.    Malinda Conway MD   4/17/2025 13:28 EDT  "

## 2025-04-22 ENCOUNTER — RESULTS FOLLOW-UP (OUTPATIENT)
Dept: INTERNAL MEDICINE | Facility: CLINIC | Age: 30
End: 2025-04-22
Payer: COMMERCIAL

## 2025-04-22 LAB
CLAM IGE QN: <0.1 KU/L
CODFISH IGE QN: <0.1 KU/L
CONV CLASS DESCRIPTION: ABNORMAL
CORN IGE QN: <0.1 KU/L
COW MILK IGE QN: <0.1 KU/L
EGG WHITE IGE QN: <0.1 KU/L
PEANUT IGE QN: <0.1 KU/L
SCALLOP IGE QN: <0.1 KU/L
SESAME SEED IGE QN: 0.2 KU/L
SHRIMP IGE QN: <0.1 KU/L
SOYBEAN IGE QN: <0.1 KU/L
WALNUT IGE QN: 0.15 KU/L
WHEAT IGE QN: 0.17 KU/L

## 2025-04-22 NOTE — TELEPHONE ENCOUNTER
Discussed the allergy profile results with pt. Patient verbalizes understanding and is comfortable with the plan of care.

## 2025-05-20 ENCOUNTER — TELEPHONE (OUTPATIENT)
Dept: ONCOLOGY | Facility: CLINIC | Age: 30
End: 2025-05-20

## 2025-05-20 NOTE — TELEPHONE ENCOUNTER
Caller: Earl Maciel    Relationship: Self    Best call back number: [phone]    What was the call regarding: PATIENT SENT A [employer] MESSAGE ON 5/8/2025 AND AGAIN ON 5/15/98222 WITH [facility] PAPERWORK THAT NEEDS TO BE COMPLETED.    PLEASE COMPLETED AND SEND IT BACK TO HER THROUGH [employer]

## 2025-05-21 ENCOUNTER — TELEPHONE (OUTPATIENT)
Dept: ONCOLOGY | Facility: CLINIC | Age: 30
End: 2025-05-21
Payer: COMMERCIAL

## 2025-05-21 NOTE — TELEPHONE ENCOUNTER
I called pt to let her know I completed her FMLA form, it's on Dr. Cataaln desk. I will fax it as soon as I get it back.

## 2025-06-04 DIAGNOSIS — R13.10 DYSPHAGIA, UNSPECIFIED TYPE: Primary | ICD-10-CM

## 2025-06-04 DIAGNOSIS — R09.A2 GLOBUS SENSATION: ICD-10-CM

## 2025-06-16 ENCOUNTER — HOSPITAL ENCOUNTER (OUTPATIENT)
Dept: ULTRASOUND IMAGING | Facility: HOSPITAL | Age: 30
Discharge: HOME | End: 2025-06-16
Payer: COMMERCIAL

## 2025-06-16 ENCOUNTER — RESULTS FOLLOW-UP (OUTPATIENT)
Dept: INTERNAL MEDICINE | Facility: CLINIC | Age: 30
End: 2025-06-16
Payer: COMMERCIAL

## 2025-06-16 DIAGNOSIS — R13.10 DYSPHAGIA, UNSPECIFIED TYPE: ICD-10-CM

## 2025-06-16 DIAGNOSIS — R09.A2 GLOBUS SENSATION: ICD-10-CM

## 2025-06-16 PROCEDURE — 76536 US EXAM OF HEAD AND NECK: CPT

## 2025-08-29 DIAGNOSIS — N94.6 MENSTRUAL CRAMPS: Primary | ICD-10-CM

## 2025-08-29 RX ORDER — IBUPROFEN 600 MG/1
600 TABLET, FILM COATED ORAL EVERY 6 HOURS
Qty: 60 TABLET | Refills: 1 | Status: SHIPPED | OUTPATIENT
Start: 2025-08-29

## (undated) DEVICE — TRAP FLD MINIVAC MEGADYNE 100ML

## (undated) DEVICE — DRAPE,UNDERBUTTOCKS,PCH,STERILE: Brand: MEDLINE

## (undated) DEVICE — NEEDLE, QUINCKE, 20GX3.5": Brand: MEDLINE

## (undated) DEVICE — LOU D & C HYSTEROSCOPY: Brand: MEDLINE INDUSTRIES, INC.

## (undated) DEVICE — GLV SURG BIOGEL LTX PF 6 1/2

## (undated) DEVICE — STRAP STIRUP SLP RNG 19X3.5IN DISP

## (undated) DEVICE — PENCL SMOKE/EVAC MEGADYNE TELESCP 10FT

## (undated) DEVICE — INTENDED FOR TISSUE SEPARATION, AND OTHER PROCEDURES THAT REQUIRE A SHARP SURGICAL BLADE TO PUNCTURE OR CUT.: Brand: BARD-PARKER ® CARBON RIB-BACK BLADES

## (undated) DEVICE — SUT SILK 2/0 FS BLK 18IN 685G

## (undated) DEVICE — ANTIBACTERIAL UNDYED BRAIDED (POLYGLACTIN 910), SYNTHETIC ABSORBABLE SUTURE: Brand: COATED VICRYL

## (undated) DEVICE — SYR LUERLOK 20CC BX/50

## (undated) DEVICE — ANTIBACTERIAL UNDYED BRAIDED (POLYGLACTIN 910), SYNTHETIC ABSORBABLE SURGICAL SUTURE: Brand: COATED VICRYL

## (undated) DEVICE — PATIENT RETURN ELECTRODE, SINGLE-USE, CONTACT QUALITY MONITORING, ADULT, WITH 9FT CORD, FOR PATIENTS WEIGING OVER 33LBS. (15KG): Brand: MEGADYNE

## (undated) DEVICE — YANKAUER,BULB TIP,W/O VENT,RIGID,STERILE: Brand: MEDLINE

## (undated) DEVICE — TUBING, SUCTION, 1/4" X 20', STRAIGHT: Brand: MEDLINE INDUSTRIES, INC.

## (undated) DEVICE — ELECTRD BALL EZ CLN STD 5IN